# Patient Record
Sex: FEMALE | Race: WHITE | NOT HISPANIC OR LATINO | Employment: FULL TIME | URBAN - METROPOLITAN AREA
[De-identification: names, ages, dates, MRNs, and addresses within clinical notes are randomized per-mention and may not be internally consistent; named-entity substitution may affect disease eponyms.]

---

## 2017-01-04 ENCOUNTER — PATIENT MESSAGE (OUTPATIENT)
Dept: OBSTETRICS AND GYNECOLOGY | Facility: CLINIC | Age: 52
End: 2017-01-04

## 2017-01-12 ENCOUNTER — PATIENT MESSAGE (OUTPATIENT)
Dept: FAMILY MEDICINE | Facility: CLINIC | Age: 52
End: 2017-01-12

## 2017-01-18 ENCOUNTER — HOSPITAL ENCOUNTER (OUTPATIENT)
Dept: RADIOLOGY | Facility: OTHER | Age: 52
Discharge: HOME | End: 2017-01-18
Attending: OBSTETRICS & GYNECOLOGY
Payer: COMMERCIAL

## 2017-01-18 DIAGNOSIS — Z01.419 WELL WOMAN EXAM WITH ROUTINE GYNECOLOGICAL EXAM: ICD-10-CM

## 2017-01-18 PROCEDURE — 77067 SCR MAMMO BI INCL CAD: CPT | Mod: 26,,, | Performed by: INTERNAL MEDICINE

## 2017-01-18 PROCEDURE — 77067 SCR MAMMO BI INCL CAD: CPT | Mod: TC

## 2017-01-19 ENCOUNTER — PATIENT MESSAGE (OUTPATIENT)
Dept: OBSTETRICS AND GYNECOLOGY | Facility: CLINIC | Age: 52
End: 2017-01-19

## 2017-01-25 ENCOUNTER — TELEPHONE (OUTPATIENT)
Dept: OBSTETRICS AND GYNECOLOGY | Facility: CLINIC | Age: 52
End: 2017-01-25

## 2017-02-01 ENCOUNTER — TELEPHONE (OUTPATIENT)
Dept: OBSTETRICS AND GYNECOLOGY | Facility: CLINIC | Age: 52
End: 2017-02-01

## 2017-02-01 ENCOUNTER — PATIENT MESSAGE (OUTPATIENT)
Dept: OBSTETRICS AND GYNECOLOGY | Facility: CLINIC | Age: 52
End: 2017-02-01

## 2017-02-01 DIAGNOSIS — Z79.890 HORMONE REPLACEMENT THERAPY: Primary | ICD-10-CM

## 2017-02-01 RX ORDER — ESTRADIOL 1 MG/1
1 TABLET ORAL DAILY
Qty: 30 TABLET | Refills: 11 | Status: SHIPPED | OUTPATIENT
Start: 2017-02-01 | End: 2017-10-25 | Stop reason: ALTCHOICE

## 2017-02-01 RX ORDER — PROGESTERONE 100 MG/1
100 CAPSULE ORAL NIGHTLY
Qty: 30 CAPSULE | Refills: 11 | Status: SHIPPED | OUTPATIENT
Start: 2017-02-01 | End: 2017-10-25 | Stop reason: ALTCHOICE

## 2017-02-01 NOTE — TELEPHONE ENCOUNTER
Returned pt's call regarding the cost of hrt and will try estradiol 1mg and prometrium 100 mgs=daily

## 2017-02-01 NOTE — TELEPHONE ENCOUNTER
----- Message from Raheel Mcintyre sent at 2/1/2017  9:23 AM CST -----  Contact: Pt  X_ 1st Request  _ 2nd Request  _ 3rd Request    Who:ALLAN LEAL [[id number]]    Why: Patient states she would like to speak with staff in regards to medication norethindrone ac-eth estradiol (FEMHRT LOW DOSE) 0.5-2.5 mg-mcg per tablet to be switch to the microgestin birth control to have [facility] pay for it. Would like to know what the doctor recommends she should do.    What Number to Call Back: [phone]    When to Expect a call back: (Before the end of the day)  -- if call after 3:00 call back will be tomorrow.

## 2017-09-05 ENCOUNTER — PATIENT MESSAGE (OUTPATIENT)
Dept: OBSTETRICS AND GYNECOLOGY | Facility: CLINIC | Age: 52
End: 2017-09-05

## 2017-10-24 ENCOUNTER — OFFICE VISIT (OUTPATIENT)
Dept: SPORTS MEDICINE | Facility: CLINIC | Age: 52
End: 2017-10-24
Payer: COMMERCIAL

## 2017-10-24 ENCOUNTER — HOSPITAL ENCOUNTER (OUTPATIENT)
Dept: RADIOLOGY | Facility: HOSPITAL | Age: 52
Discharge: HOME | End: 2017-10-24
Attending: FAMILY MEDICINE
Payer: COMMERCIAL

## 2017-10-24 VITALS — WEIGHT: 169 LBS | BODY MASS INDEX: 31.1 KG/M2 | TEMPERATURE: 98 F | HEIGHT: 62 IN

## 2017-10-24 DIAGNOSIS — M25.521 RIGHT ELBOW PAIN: ICD-10-CM

## 2017-10-24 DIAGNOSIS — M77.11 RIGHT LATERAL EPICONDYLITIS: Primary | ICD-10-CM

## 2017-10-24 PROCEDURE — 99204 OFFICE O/P NEW MOD 45 MIN: CPT | Mod: 25,S$GLB,, | Performed by: FAMILY MEDICINE

## 2017-10-24 PROCEDURE — 99999 PR PBB SHADOW E&M-EST. PATIENT-LVL III: CPT | Mod: PBBFAC,,, | Performed by: FAMILY MEDICINE

## 2017-10-24 PROCEDURE — 20551 NJX 1 TENDON ORIGIN/INSJ: CPT | Mod: RT,S$GLB,, | Performed by: FAMILY MEDICINE

## 2017-10-24 PROCEDURE — 73080 X-RAY EXAM OF ELBOW: CPT | Mod: 26,RT,, | Performed by: RADIOLOGY

## 2017-10-24 PROCEDURE — 73080 X-RAY EXAM OF ELBOW: CPT | Mod: TC,PO,RT

## 2017-10-24 RX ORDER — TRIAMCINOLONE ACETONIDE 40 MG/ML
40 INJECTION, SUSPENSION INTRA-ARTICULAR; INTRAMUSCULAR
Status: DISCONTINUED | OUTPATIENT
Start: 2017-10-24 | End: 2017-10-24 | Stop reason: HOSPADM

## 2017-10-24 RX ADMIN — TRIAMCINOLONE ACETONIDE 40 MG: 40 INJECTION, SUSPENSION INTRA-ARTICULAR; INTRAMUSCULAR at 02:10

## 2017-10-24 NOTE — PROGRESS NOTES
Sara Ferro, a 51 y.o. female, is here for evaluation of RIGHT elbow pain.     New patient    HISTORY OF PRESENT ILLNESS  Hand dominance: right    Location: lateral elbow, right  Onset: insidious, ~ late September    Palliative:    Relative rest   Oral analgesics (NSAIDs)   Elbow sleeve   Ice/heat   Provocative:   ADLs   Gripping, grasping, wrist extension  Prior: none  Progression: plateau discomfort  Quality:   Ache at times  Sharp   Shooting pain   Radiation: none   Severity: per nursing documentation  Timing: intermittent w/ use  Trauma:    September 2017: pain following playing volleyball     Review of systems (ROS):  A 10+ review of systems was performed with pertinent positives and negatives noted above in the history of present illness. Other systems were negative unless otherwise specified.    PHYSICAL EXAMINATION  General: Patient appears alert and oriented x 3.  Mood is pleasant.  Observation of ears, eyes and nose reveal no gross abnormalities. HEENT: NCAT, sclera nonicteric  Lungs: Respirations are equal and unlabored.  Well nourished, in no acute distress and ambulates with a non-antalgic gait with no assistive devices.    Skin: Skin intact bilaterally. Sensation intact bilaterally. Compartments soft. No evidence of edema, infection, or induration.     RIGHT ELBOW EXAMINATION    Observation/Inspection  Swelling  Present    Deformity  none  Discoloration  none     Scars   none    Atrophy  none    Tenderness / Crepitus (T/C):          T / C        Medial epicondyle   + / -    Med. (Ulnar) collateral ligament  - / -    Flexor pronator Musculature   - / -   Biceps tendon    - / -   Head of radius    - / -    Lateral epicondyle   + / -    Extensor Musculature   + / -   Brachioradialis   - / -   Triceps tendon   - / -   Triceps muscle   - / -   Olecranon    - / -   Olecranon bursa   - / -   Cubital fossa    - / -   Anterior jointline   - / -   Radial tunnel    -/ -             ROM: ('*' = with  pain)    Right Elbow  AROM (PROM)     Extension   0 deg  (5 deg)*   Flexion   145 deg (145 deg)   Pronation  90 deg  (90 deg)*  Supination   80 deg  (80 deg) *    Left Elbow  AROM (PROM)   Extension   0 deg  (5 deg)  Flexion   145 deg (145 deg)   Pronation  90 deg  (90 deg)   Supination   80 deg  (80 deg)      Right Wrist  AROM (PROM)   Extension   80 deg (85 deg)*  Flexion   80 deg (85 deg) *  Ulnar Deviation   35 deg (40 deg)  Radial Deviation 35 deg (40 deg)     Left Wrist   AROM (PROM)  Extension   80 deg (85 deg)  Flexion   80 deg (85 deg)   Ulnar Deviation   35 deg (40 deg)  Radial Deviation 35 deg (40 deg)     Strength: ('*' = with pain)    Elbow Flexion   5/5*  Elbow Extension  5/5  Wrist Flexion   5/5*  Wrist Extension  5/5*      5/5*  Intrinsics   5/5  EPL (Ext. pollicis longus) 5/5  Pinch Mechanism  5/5    Elbow Examination:  See above noted areas of tenderness.   Test for Ligamentous Instability - UCL normal  Test for Ligamentous Instability - LUCL normal  PLRI       -  Tinel's (Percussion) Test - Cubital  -  Tennis Elbow Test    -  Golfer's Elbow Test    -  Radial Capitellar Grind Test   -  Valgus/Extension Overload Test  -  Resisted Long Finger Extension Pain -  Moving Valgus    -  Forearm pain with resisted supination -  Yeargeson' s (elbow pain)   -  Hook test     -    Wrist Examination:  See above noted areas of tenderness.   Finkelstein's Test    -  Tinel's Test - Carpal Tunnel   -  Phalen's Test     -  Median Nerve Compression Test  -  Ulnar-sided Compression Test  -  LT Ballottment Test    -  Snuff box tenderness    -  Bradford's Test     -  LT Instability     -  Hook of Hamate Tenderness   -     Extremity Neuro-vascular Testing: Sensation grossly intact to light touch all dermatomal regions. DTR 2+ Biceps, Triceps, BR and Negative Lizzeth's sign. Grossly intact motor function at Elbow, Wrist and Hand. Distal pulses radial and ulnar 2+, brisk cap refill, symmetric.    Other  Findings:    ASSESSMENT & PLAN   Assessment:   #1 lateral epicondylitis, right  No evidence of osseous pathology  No evidence of neurologic pathology  No evidence of vascular pathology    Imaging studies reviewed:   X-ray elbow, right 17.10    Plan:      We discussed options including:  #1 watchful waiting  #2 physical/occupational therapy aimed at:   RoM elbow   Eccentric strengthening common extensor musculature   Stretching common extensor tendon    Discussed, deferred by pt  #3 injection therapy:   CSI, origin of common extensor tendon of forearm     Right,     Left,      Discussed the likely transient therapeutic effects of CSIs in the     treatment of epicondylitis  #4 MRI elbow for further evaluation     The patient chooses #3 csi common extensor tendon of forearm right    Pain management required: handout given  Bracing required:    Tennis elbow strap, consider   Wrist immobilization brace, consider  Physical therapy required:    nfpt  Activity (e.g. sports, work) restrictions: as tolerated   school/vocation: member of [employer],      Follow up appointment offered, deferred by patient  A/e csi common extensor tendon of forearm  Effective-->hgPT, f/u in 12 w  Ineffective-->MRI elbow  Should symptoms worsen or fail to resolve, consider:  Revisiting the above options

## 2017-10-24 NOTE — PROCEDURES
Tendon Origin  Date/Time: 10/24/2017 2:53 PM  Performed by: JACQUI TOLENTINO  Authorized by: JACQUI TOLENTINO     Consent Done?:  Yes (Verbal)  Timeout: prior to procedure the correct patient, procedure, and site was verified    Indications:  Pain  Site marked: the procedure site was marked    Timeout: prior to procedure the correct patient, procedure, and site was verified    Location:  Elbow  Site:  R elbow  Prep: patient was prepped and draped in usual sterile fashion    Ultrasonic Guidance for Needle Placement?: No    Needle size:  22 G  Approach:  Anterolateral  Medications:  40 mg triamcinolone acetonide 40 mg/mL  Patient tolerance:  Patient tolerated the procedure well with no immediate complications   Origin of common extensor tendon at the lateral epicondyle

## 2017-10-25 ENCOUNTER — OFFICE VISIT (OUTPATIENT)
Dept: OBSTETRICS AND GYNECOLOGY | Facility: CLINIC | Age: 52
End: 2017-10-25
Payer: COMMERCIAL

## 2017-10-25 VITALS
WEIGHT: 175.06 LBS | DIASTOLIC BLOOD PRESSURE: 84 MMHG | BODY MASS INDEX: 32.02 KG/M2 | SYSTOLIC BLOOD PRESSURE: 142 MMHG

## 2017-10-25 DIAGNOSIS — Z01.419 WOMEN'S ANNUAL ROUTINE GYNECOLOGICAL EXAMINATION: Primary | ICD-10-CM

## 2017-10-25 DIAGNOSIS — R23.4 BREAST SKIN CHANGES: ICD-10-CM

## 2017-10-25 DIAGNOSIS — N64.4 BREAST PAIN: ICD-10-CM

## 2017-10-25 DIAGNOSIS — N76.0 ACUTE VAGINITIS: ICD-10-CM

## 2017-10-25 PROCEDURE — 87660 TRICHOMONAS VAGIN DIR PROBE: CPT

## 2017-10-25 PROCEDURE — 99999 PR PBB SHADOW E&M-EST. PATIENT-LVL III: CPT | Mod: PBBFAC,,, | Performed by: NURSE PRACTITIONER

## 2017-10-25 PROCEDURE — 99396 PREV VISIT EST AGE 40-64: CPT | Mod: S$GLB,,, | Performed by: NURSE PRACTITIONER

## 2017-10-25 PROCEDURE — 87480 CANDIDA DNA DIR PROBE: CPT

## 2017-10-25 RX ORDER — FLUCONAZOLE 150 MG/1
150 TABLET ORAL ONCE
Qty: 2 TABLET | Refills: 1 | Status: SHIPPED | OUTPATIENT
Start: 2017-10-25 | End: 2017-10-25

## 2017-10-25 NOTE — PROGRESS NOTES
CC: Annual  HPI: Pt is a 51 y.o.  female who presents for routine annual exam. She is postmenopausal.  Denies any episodes of postmenopausal bleeding.  She does not want STD screening.   Pt is complaining  of bilateral breast pain that began 2 weeks.  The pain is located all over bilateral breasts.  She describes the pain as aching and sharp- nipple extremely sensitive.  She is .  She is not on OCPs or HRT.  Reports skin changes to right breast- dark mole/ unsure when it first appeared- reports it has changed in color- darkened.  She denies nipple discharge.  She is not breastfeeding or pumping.  Denies any recent vigorous or repetitive use of pectoral muscles.  No associated neck, back, or shoulder problems.  No associated fever or erythema.  No recent history of trauma to the chest.  She has tried OTC NSAIDs with no relief of symptoms.  The pain does not affect her ability to perform daily activities.  Reports + family history of breast cancer- maternal and paternal 1st cousins and paternal aunt.   Pt is also c/o vaginal DC and itching.  Reports she has been using scented Epsom salt soaks.         ROS:  GENERAL: Feeling well overall. Denies fever or chills.   SKIN: Denies rash or lesions.   HEAD: Denies head injury or headache.   NODES: Denies enlarged lymph nodes.   CHEST: Denies chest pain or shortness of breath.   CARDIOVASCULAR: Denies palpitations or left sided chest pain.   ABDOMEN: No abdominal pain, constipation, diarrhea, nausea, vomiting or rectal bleeding.   URINARY: No dysuria, hematuria, or burning on urination.  REPRODUCTIVE: See HPI.   BREASTS: Denies pain, lumps, or nipple discharge.   HEMATOLOGIC: No easy bruisability or excessive bleeding.   MUSCULOSKELETAL: Denies joint pain or swelling.   NEUROLOGIC: Denies syncope or weakness.   PSYCHIATRIC: Denies depression, anxiety or mood swings.    PE:   APPEARANCE: Well nourished, well developed, White female in no acute distress.  NODES: no cervical,  supraclavicular, or inguinal lymphadenopathy  BREASTS: Symmetrical, + 1 mm darkened flat area to right breast.  No visible lesions. No palpable masses, nipple discharge or adenopathy bilaterally.  ABDOMEN: Soft. No tenderness or masses. No distention. No hernias palpated. No CVA tenderness.  VULVA: No lesions. Normal external female genitalia.  URETHRAL MEATUS: Normal size and location, no lesions, no prolapse.  URETHRA: No masses, tenderness, or prolapse.  VAGINA: Moist. No lesions or lacerations noted. No abnormal discharge present. No odor present.   CERVIX: No lesions or discharge. No cervical motion tenderness.   UTERUS: Normal size, regular shape, mobile, non-tender.  ADNEXA: No tenderness. No fullness or masses palpated in the adnexal regions.   ANUS PERINEUM: Normal.          Diagnosis:  1. Women's annual routine gynecological examination    2. Breast pain    3. Breast skin changes    4. Acute vaginitis        Plan:   Pap not indicated- last pap 12/2016 WNL  Mammo and US  Referral to breast center for evaluation of pain  Discussed to ensure well fitted support bra.   Affirm  Diflucan    Orders Placed This Encounter    Vaginosis Screen by DNA Probe    Mammo Digital Diagnostic Bilat with Jhon    US Breast Bilateral Complete    Ambulatory Referral to Breast Surgery    fluconazole (DIFLUCAN) 150 MG Tab     Patient was counseled today on the new ACS guidelines for cervical cytology screening as well as the current recommendations for breast cancer screening. She was counseled to follow up with her PCP for other routine health maintenance. Counseling session lasted approximately 10 minutes, and all her questions were answered.    Follow-up with me in 1 year for routine exam    SUSANNE Cross

## 2017-10-26 LAB
CANDIDA RRNA VAG QL PROBE: POSITIVE
G VAGINALIS RRNA GENITAL QL PROBE: NEGATIVE
T VAGINALIS RRNA GENITAL QL PROBE: NEGATIVE

## 2017-11-02 ENCOUNTER — TELEPHONE (OUTPATIENT)
Dept: OBSTETRICS AND GYNECOLOGY | Facility: CLINIC | Age: 52
End: 2017-11-02

## 2017-11-02 ENCOUNTER — HOSPITAL ENCOUNTER (OUTPATIENT)
Dept: RADIOLOGY | Facility: HOSPITAL | Age: 52
Discharge: HOME | End: 2017-11-02
Attending: NURSE PRACTITIONER
Payer: COMMERCIAL

## 2017-11-02 VITALS — HEIGHT: 62 IN | BODY MASS INDEX: 32.2 KG/M2 | WEIGHT: 175 LBS

## 2017-11-02 DIAGNOSIS — Z12.31 VISIT FOR SCREENING MAMMOGRAM: Primary | ICD-10-CM

## 2017-11-02 DIAGNOSIS — N64.4 BREAST PAIN: ICD-10-CM

## 2017-11-02 PROCEDURE — 77066 DX MAMMO INCL CAD BI: CPT | Mod: TC

## 2017-11-02 PROCEDURE — 77062 BREAST TOMOSYNTHESIS BI: CPT | Mod: 26,,, | Performed by: RADIOLOGY

## 2017-11-02 PROCEDURE — 77066 DX MAMMO INCL CAD BI: CPT | Mod: 26,,, | Performed by: RADIOLOGY

## 2017-11-02 PROCEDURE — 76641 ULTRASOUND BREAST COMPLETE: CPT | Mod: 26,LT,, | Performed by: RADIOLOGY

## 2017-11-02 PROCEDURE — 76641 ULTRASOUND BREAST COMPLETE: CPT | Mod: 26,RT,, | Performed by: RADIOLOGY

## 2017-11-02 PROCEDURE — 76641 ULTRASOUND BREAST COMPLETE: CPT | Mod: TC,50

## 2017-11-02 NOTE — TELEPHONE ENCOUNTER
Called and informed patient that after reviewing her chart, no changes are needed. Patient verbalized understanding.

## 2017-11-02 NOTE — TELEPHONE ENCOUNTER
----- Message from Anamaria Jack sent at 11/2/2017  8:15 AM CDT -----  Contact: self  Patient states her insurance company does not cover the 3D mammogram and is requesting to order to be changed to a routine screening mammogram. Patient has appointment for 10:30 at [facility] breast [place].

## 2017-11-02 NOTE — TELEPHONE ENCOUNTER
----- Message from Anamaria Jack sent at 11/2/2017  8:15 AM CDT -----  Contact: self  Patient states her insurance company does not cover the 3D mammogram and is requesting to order to be changed to a routine screening mammogram. Patient has appointment for 10:30 at [facility] breast [place].

## 2018-02-27 ENCOUNTER — OFFICE VISIT (OUTPATIENT)
Dept: SPORTS MEDICINE | Facility: CLINIC | Age: 53
End: 2018-02-27
Payer: COMMERCIAL

## 2018-02-27 VITALS — TEMPERATURE: 98 F | HEIGHT: 62 IN | WEIGHT: 175 LBS | BODY MASS INDEX: 32.2 KG/M2

## 2018-02-27 DIAGNOSIS — M25.521 RIGHT ELBOW PAIN: ICD-10-CM

## 2018-02-27 DIAGNOSIS — M77.11 RIGHT LATERAL EPICONDYLITIS: Primary | ICD-10-CM

## 2018-02-27 PROCEDURE — 20551 NJX 1 TENDON ORIGIN/INSJ: CPT | Mod: RT,S$GLB,, | Performed by: FAMILY MEDICINE

## 2018-02-27 PROCEDURE — 3008F BODY MASS INDEX DOCD: CPT | Mod: S$GLB,,, | Performed by: FAMILY MEDICINE

## 2018-02-27 PROCEDURE — 99214 OFFICE O/P EST MOD 30 MIN: CPT | Mod: 25,S$GLB,, | Performed by: FAMILY MEDICINE

## 2018-02-27 PROCEDURE — 99999 PR PBB SHADOW E&M-EST. PATIENT-LVL III: CPT | Mod: PBBFAC,,, | Performed by: FAMILY MEDICINE

## 2018-02-27 RX ORDER — TRIAMCINOLONE ACETONIDE 40 MG/ML
40 INJECTION, SUSPENSION INTRA-ARTICULAR; INTRAMUSCULAR
Status: DISCONTINUED | OUTPATIENT
Start: 2018-02-27 | End: 2018-02-27 | Stop reason: HOSPADM

## 2018-02-27 RX ADMIN — TRIAMCINOLONE ACETONIDE 40 MG: 40 INJECTION, SUSPENSION INTRA-ARTICULAR; INTRAMUSCULAR at 12:02

## 2018-02-27 NOTE — PROGRESS NOTES
Sara Ferro, a 52 y.o. female, is here for evaluation of RIGHT elbow pain.     HISTORY OF PRESENT ILLNESS  Hand dominance: right    Location: lateral elbow, right  Onset: insidious, ~ late September 2017  Palliative:    Relative rest   Oral analgesics (NSAIDs)   Elbow sleeve   Ice/heat    CSI, lat epi, 10/24/17, effective 100%  Provocative:   ADLs   Gripping, grasping, wrist extension  Prior: none  Progression: worsening discomfort since mid-February 2018  Quality:   Ache at times  Sharp   Pain at night   Radiation: none   Severity: per nursing documentation  Timing: intermittent w/ use  Trauma:    September 2017: pain following playing volleyball     Review of systems (ROS):  A 10+ review of systems was performed with pertinent positives and negatives noted above in the history of present illness. Other systems were negative unless otherwise specified.    PHYSICAL EXAMINATION  General: Patient appears alert and oriented x 3.  Mood is pleasant.  Observation of ears, eyes and nose reveal no gross abnormalities. HEENT: NCAT, sclera nonicteric  Lungs: Respirations are equal and unlabored.  Well nourished, in no acute distress and ambulates with a non-antalgic gait with no assistive devices.    Skin: Skin intact bilaterally. Sensation intact bilaterally. Compartments soft. No evidence of edema, infection, or induration.     RIGHT ELBOW EXAMINATION    Observation/Inspection  Swelling  Present    Deformity  none  Discoloration  none     Scars   none    Atrophy  none    Tenderness / Crepitus (T/C):          T / C        Medial epicondyle   + / -    Med. (Ulnar) collateral ligament  - / -    Flexor pronator Musculature   - / -   Biceps tendon    - / -   Head of radius    - / -    Lateral epicondyle   + / -    Extensor Musculature   + / -   Brachioradialis   - / -   Triceps tendon   - / -   Triceps muscle   - / -   Olecranon    - / -   Olecranon bursa   - / -   Cubital fossa    - / -   Anterior jointline   - / -    Radial tunnel    -/ -             ROM: ('*' = with pain)    Right Elbow  AROM (PROM)     Extension   0 deg  (5 deg)*   Flexion   145 deg (145 deg)   Pronation  90 deg  (90 deg)*  Supination   80 deg  (80 deg) *    Left Elbow  AROM (PROM)   Extension   0 deg  (5 deg)  Flexion   145 deg (145 deg)   Pronation  90 deg  (90 deg)   Supination   80 deg  (80 deg)      Right Wrist  AROM (PROM)   Extension   80 deg (85 deg)*  Flexion   80 deg (85 deg) *  Ulnar Deviation   35 deg (40 deg)  Radial Deviation 35 deg (40 deg)     Left Wrist   AROM (PROM)  Extension   80 deg (85 deg)  Flexion   80 deg (85 deg)   Ulnar Deviation   35 deg (40 deg)  Radial Deviation 35 deg (40 deg)     Strength: ('*' = with pain)    Elbow Flexion   5/5*  Elbow Extension  5/5  Wrist Flexion   5/5*  Wrist Extension  5/5*      5/5*  Intrinsics   5/5  EPL (Ext. pollicis longus) 5/5  Pinch Mechanism  5/5    Elbow Examination:  See above noted areas of tenderness.   Test for Ligamentous Instability - UCL normal  Test for Ligamentous Instability - LUCL normal  PLRI       -  Tinel's (Percussion) Test - Cubital  -  Tennis Elbow Test    -  Golfer's Elbow Test    -  Radial Capitellar Grind Test   -  Valgus/Extension Overload Test  -  Resisted Long Finger Extension Pain +  Moving Valgus     -  Forearm pain with resisted supination -  Yeargeson' s (elbow pain)   -  Hook test     -    Wrist Examination:  See above noted areas of tenderness.   Finkelstein's Test    -  Tinel's Test - Carpal Tunnel   -  Phalen's Test     -  Median Nerve Compression Test  -  Ulnar-sided Compression Test  -  LT Ballottment Test    -  Snuff box tenderness    -  Bradford's Test     -  LT Instability     -  Hook of Hamate Tenderness   -     Extremity Neuro-vascular Testing: Sensation grossly intact to light touch all dermatomal regions. DTR 2+ Biceps, Triceps, BR and Negative Lizzeth's sign. Grossly intact motor function at Elbow, Wrist and Hand. Distal pulses radial and ulnar 2+,  brisk cap refill, symmetric.    Other Findings:    ASSESSMENT & PLAN   Assessment:   #1 lateral epicondylitis, right  No evidence of osseous pathology  No evidence of neurologic pathology  No evidence of vascular pathology    Imaging studies reviewed:   X-ray elbow, right 17.10    Plan:      We discussed options including:  #1 watchful waiting  #2 physical/occupational therapy aimed at:   RoM elbow   Eccentric strengthening common extensor musculature   Stretching common extensor tendon    Discussed, deferred by pt  #3 injection therapy:   CSI, origin of common extensor tendon of forearm     Right, effective 100%, repeat    Left,      Discussed the likely transient therapeutic effects of CSIs in the     treatment of epicondylitis  #4 per ten, common extensor tendon   Likely in 18.12     The patient chooses #3 csi common extensor tendon of forearm right    Pain management required: handout given  Bracing required:    Tennis elbow strap, discussed today   Wrist immobilization brace, consider  Physical therapy required:    nfpt  Activity (e.g. sports, work) restrictions: as tolerated   school/vocation: member of [employer],      Follow up in 12 w  Did she get tennis elbow strap? Was it effective?  A/e csi le  Should symptoms worsen or fail to resolve, consider:  Revisiting the above options

## 2018-02-27 NOTE — PROCEDURES
Tendon Origin  Date/Time: 2/27/2018 12:01 PM  Performed by: JACQUI TOLENTINO  Authorized by: JACQUI TOLENTINO     Consent Done?:  Yes (Verbal)  Timeout: prior to procedure the correct patient, procedure, and site was verified    Indications:  Pain  Site marked: the procedure site was marked    Timeout: prior to procedure the correct patient, procedure, and site was verified    Location:  Elbow  Site:  R elbow  Prep: patient was prepped and draped in usual sterile fashion    Ultrasonic Guidance for Needle Placement?: No    Needle size:  22 G  Approach:  Anterolateral  Medications:  40 mg triamcinolone acetonide 40 mg/mL  Patient tolerance:  Patient tolerated the procedure well with no immediate complications   Origin of common extensor tendon at the lateral epicondyle

## 2018-06-07 DIAGNOSIS — Z12.11 COLON CANCER SCREENING: Primary | ICD-10-CM

## 2018-06-11 ENCOUNTER — DOCUMENTATION ONLY (OUTPATIENT)
Dept: FAMILY MEDICINE | Facility: CLINIC | Age: 53
End: 2018-06-11

## 2018-06-11 ENCOUNTER — OFFICE VISIT (OUTPATIENT)
Dept: FAMILY MEDICINE | Facility: CLINIC | Age: 53
End: 2018-06-11
Payer: COMMERCIAL

## 2018-06-11 VITALS
RESPIRATION RATE: 16 BRPM | OXYGEN SATURATION: 98 % | HEART RATE: 79 BPM | HEIGHT: 62 IN | SYSTOLIC BLOOD PRESSURE: 110 MMHG | BODY MASS INDEX: 32.74 KG/M2 | DIASTOLIC BLOOD PRESSURE: 82 MMHG | TEMPERATURE: 98 F | WEIGHT: 177.94 LBS

## 2018-06-11 DIAGNOSIS — M77.11 RIGHT LATERAL EPICONDYLITIS: ICD-10-CM

## 2018-06-11 DIAGNOSIS — Z00.00 ANNUAL PHYSICAL EXAM: Primary | ICD-10-CM

## 2018-06-11 DIAGNOSIS — R53.81 MALAISE AND FATIGUE: ICD-10-CM

## 2018-06-11 DIAGNOSIS — R53.83 MALAISE AND FATIGUE: ICD-10-CM

## 2018-06-11 DIAGNOSIS — N95.1 MENOPAUSAL AND FEMALE CLIMACTERIC STATES: ICD-10-CM

## 2018-06-11 PROCEDURE — 99396 PREV VISIT EST AGE 40-64: CPT | Mod: S$GLB,,, | Performed by: INTERNAL MEDICINE

## 2018-06-11 NOTE — PATIENT INSTRUCTIONS
High intensity training for exercise.  (HIT)    Use a Swiss ball.    Get a back buddy Keshav.  Smart temp cold pack.

## 2018-06-11 NOTE — PROGRESS NOTES
Health Maintenance Due   Topic Date Due    TETANUS VACCINE  11/29/1984    Colonoscopy  11/29/2016

## 2018-06-11 NOTE — PROGRESS NOTES
"Subjective:       Patient ID: Sara Ferro is a 52 y.o. female.    Chief Complaint: Annual Exam (wellness)    HPI     Has had elbow tendenitis for 1 y    Not getting exercise due to that.     Non-smoker, no drugs. Rare alcohol.    Post-menopause, lots of hot flashes.        Review of Systems   Constitutional: Positive for diaphoresis and fatigue. Negative for fever and unexpected weight change.   HENT: Negative for dental problem, hearing loss, nosebleeds, rhinorrhea, tinnitus, trouble swallowing and voice change.    Eyes: Negative for itching and visual disturbance.   Respiratory: Negative for cough, shortness of breath and wheezing.    Cardiovascular: Negative for chest pain and palpitations.   Gastrointestinal: Negative for abdominal pain, blood in stool, constipation, diarrhea, nausea and vomiting.   Endocrine: Negative for cold intolerance, heat intolerance, polydipsia and polyphagia.   Genitourinary: Negative for difficulty urinating and dysuria.   Musculoskeletal: Negative for arthralgias.   Allergic/Immunologic: Negative for environmental allergies and immunocompromised state.   Neurological: Negative for dizziness, seizures, weakness, numbness and headaches.   Hematological: Does not bruise/bleed easily.   Psychiatric/Behavioral: Positive for sleep disturbance (hot flashes. severe). Negative for agitation, dysphoric mood and suicidal ideas. The patient is not nervous/anxious.        Objective:      Vitals:    06/11/18 1531   BP: 110/82   Pulse: 79   Resp: 16   Temp: 97.7 °F (36.5 °C)   TempSrc: Oral   SpO2: 98%   Weight: 80.7 kg (177 lb 14.6 oz)   Height: 5' 2" (1.575 m)   PainSc:   7   PainLoc: Back     Physical Exam   Constitutional: She is oriented to person, place, and time. She appears well-nourished. No distress.   HENT:   Head: Normocephalic and atraumatic.   Right Ear: External ear normal.   Left Ear: External ear normal.   Mouth/Throat: Oropharynx is clear and moist. No oropharyngeal " exudate.   Eyes: Conjunctivae and EOM are normal. Pupils are equal, round, and reactive to light. No scleral icterus.   Neck: Normal range of motion. Neck supple. No thyromegaly present.   Cardiovascular: Normal rate, regular rhythm, normal heart sounds and intact distal pulses.  Exam reveals no gallop and no friction rub.    No murmur heard.  Pulmonary/Chest: Effort normal and breath sounds normal. No respiratory distress. She has no wheezes. She has no rales. She exhibits no tenderness.   Abdominal: Soft. Bowel sounds are normal. She exhibits no distension. There is no tenderness.   Musculoskeletal: Normal range of motion. She exhibits no edema or tenderness.   Lymphadenopathy:     She has no cervical adenopathy.   Neurological: She is alert and oriented to person, place, and time. She displays normal reflexes. No cranial nerve deficit. She exhibits normal muscle tone. Coordination normal.   Skin: Skin is warm and dry. No rash noted.   Psychiatric: She has a normal mood and affect. Her behavior is normal. Judgment and thought content normal.   Nursing note and vitals reviewed.        Assessment:       No diagnosis found.      Plan:     There are no diagnoses linked to this encounter.  No Follow-up on file.

## 2018-11-12 ENCOUNTER — OFFICE VISIT (OUTPATIENT)
Dept: SPORTS MEDICINE | Facility: CLINIC | Age: 53
End: 2018-11-12
Payer: COMMERCIAL

## 2018-11-12 ENCOUNTER — HOSPITAL ENCOUNTER (OUTPATIENT)
Dept: RADIOLOGY | Facility: HOSPITAL | Age: 53
Discharge: HOME | End: 2018-11-12
Attending: FAMILY MEDICINE
Payer: COMMERCIAL

## 2018-11-12 VITALS — TEMPERATURE: 98 F | HEIGHT: 62 IN | WEIGHT: 177.94 LBS | BODY MASS INDEX: 32.74 KG/M2

## 2018-11-12 DIAGNOSIS — M25.521 RIGHT ELBOW PAIN: ICD-10-CM

## 2018-11-12 DIAGNOSIS — R53.81 PHYSICAL DECONDITIONING: ICD-10-CM

## 2018-11-12 DIAGNOSIS — M77.11 LATERAL EPICONDYLITIS OF RIGHT ELBOW: Primary | ICD-10-CM

## 2018-11-12 PROCEDURE — 76942 ECHO GUIDE FOR BIOPSY: CPT | Mod: 26,S$GLB,, | Performed by: FAMILY MEDICINE

## 2018-11-12 PROCEDURE — 73080 X-RAY EXAM OF ELBOW: CPT | Mod: TC,FY,PO,RT

## 2018-11-12 PROCEDURE — 20551 NJX 1 TENDON ORIGIN/INSJ: CPT | Mod: RT,S$GLB,, | Performed by: FAMILY MEDICINE

## 2018-11-12 PROCEDURE — 99214 OFFICE O/P EST MOD 30 MIN: CPT | Mod: 25,S$GLB,, | Performed by: FAMILY MEDICINE

## 2018-11-12 PROCEDURE — 3008F BODY MASS INDEX DOCD: CPT | Mod: CPTII,S$GLB,, | Performed by: FAMILY MEDICINE

## 2018-11-12 PROCEDURE — 73080 X-RAY EXAM OF ELBOW: CPT | Mod: 26,RT,, | Performed by: RADIOLOGY

## 2018-11-12 PROCEDURE — 99999 PR PBB SHADOW E&M-EST. PATIENT-LVL III: CPT | Mod: PBBFAC,,, | Performed by: FAMILY MEDICINE

## 2018-11-12 RX ORDER — TRIAMCINOLONE ACETONIDE 40 MG/ML
40 INJECTION, SUSPENSION INTRA-ARTICULAR; INTRAMUSCULAR
Status: DISCONTINUED | OUTPATIENT
Start: 2018-11-12 | End: 2018-11-12 | Stop reason: HOSPADM

## 2018-11-12 RX ADMIN — TRIAMCINOLONE ACETONIDE 40 MG: 40 INJECTION, SUSPENSION INTRA-ARTICULAR; INTRAMUSCULAR at 11:11

## 2018-11-12 NOTE — PROCEDURES
"Tendon Origin: R elbow  Date/Time: 11/12/2018 11:24 AM  Performed by: Jovani Blanton MD  Authorized by: Jovani Blanton MD     Consent Done?:  Yes (Verbal)  Timeout: prior to procedure the correct patient, procedure, and site was verified    Indications:  Pain  Site marked: the procedure site was marked    Timeout: prior to procedure the correct patient, procedure, and site was verified    Location:  Elbow  Site:  R elbow  Prep: patient was prepped and draped in usual sterile fashion    Ultrasonic Guidance for Needle Placement?: Yes    Needle size:  25 G  Approach:  Anterolateral  Medications:  40 mg triamcinolone acetonide 40 mg/mL  Patient tolerance:  Patient tolerated the procedure well with no immediate complications   Origin of common extensor tendon at the lateral epicondyle  Description of ultrasound utilization for needle guidance:   Ultrasound guidance used for needle localization. Images saved and stored for documentation. The common extensor tendon was visualized at its origin at the lateral epicondyle. Dynamic visualization of the 25g x 1.5" needle was continuous throughout the procedure.      "

## 2018-11-12 NOTE — PROGRESS NOTES
Sara Ferro, a 52 y.o. female, is here for evaluation of RIGHT elbow pain.     HISTORY OF PRESENT ILLNESS  Hand dominance: right    Location: lateral elbow, right  Onset: insidious, ~ late September 2017  Palliative:    Relative rest   Oral analgesics (NSAIDs)   Elbow sleeve   Ice/heat    CSI, lat epi, 10/24/17, effective 100%   CSI, lat epi, 17.02.27, effective 6 months  Provocative:   ADLs   Gripping, grasping, wrist extension  Prior: none  Progression: worsening discomfort since mid-February 2018  Quality:   Ache at times  Sharp   Pain at night   Radiation: none   Severity: per nursing documentation  Timing: intermittent w/ use  Trauma:    September 2017: pain following playing volleyball     Review of systems (ROS):  A 10+ review of systems was performed with pertinent positives and negatives noted above in the history of present illness. Other systems were negative unless otherwise specified.    PHYSICAL EXAMINATION  General: Patient appears alert and oriented x 3.  Mood is pleasant.  Observation of ears, eyes and nose reveal no gross abnormalities. HEENT: NCAT, sclera nonicteric  Lungs: Respirations are equal and unlabored.  Well nourished, in no acute distress and ambulates with a non-antalgic gait with no assistive devices.    Skin: Skin intact bilaterally. Sensation intact bilaterally. Compartments soft. No evidence of edema, infection, or induration.     RIGHT ELBOW EXAMINATION    Observation/Inspection  Swelling  Present    Deformity  none  Discoloration  none     Scars   none    Atrophy  none    Tenderness / Crepitus (T/C):          T / C        Medial epicondyle   + / -    Med. (Ulnar) collateral ligament  - / -    Flexor pronator Musculature   - / -   Biceps tendon    - / -   Head of radius    - / -    Lateral epicondyle   + / -    Extensor Musculature   + / -   Brachioradialis   - / -   Triceps tendon   - / -   Triceps muscle   - / -   Olecranon    - / -   Olecranon bursa   - / -   Cubital  fossa    - / -   Anterior jointline   - / -   Radial tunnel    -/ -             ROM: ('*' = with pain)    Right Elbow  AROM (PROM)     Extension   0 deg  (5 deg)*   Flexion   145 deg (145 deg)   Pronation  90 deg  (90 deg)*  Supination   80 deg  (80 deg) *    Left Elbow  AROM (PROM)   Extension   0 deg  (5 deg)  Flexion   145 deg (145 deg)   Pronation  90 deg  (90 deg)   Supination   80 deg  (80 deg)      Right Wrist  AROM (PROM)   Extension   80 deg (85 deg)*  Flexion   80 deg (85 deg) *  Ulnar Deviation   35 deg (40 deg)  Radial Deviation 35 deg (40 deg)     Left Wrist   AROM (PROM)  Extension   80 deg (85 deg)  Flexion   80 deg (85 deg)   Ulnar Deviation   35 deg (40 deg)  Radial Deviation 35 deg (40 deg)     Strength: ('*' = with pain)    Elbow Flexion   5/5*  Elbow Extension  5/5  Wrist Flexion   5/5*  Wrist Extension  5/5*      5/5*  Intrinsics   5/5  EPL (Ext. pollicis longus) 5/5  Pinch Mechanism  5/5    Elbow Examination:  See above noted areas of tenderness.   Test for Ligamentous Instability - UCL normal  Test for Ligamentous Instability - LUCL normal  PLRI       -  Tinel's (Percussion) Test - Cubital  -  Tennis Elbow Test    -  Golfer's Elbow Test    -  Radial Capitellar Grind Test   -  Valgus/Extension Overload Test  -  Resisted Long Finger Extension Pain +  Moving Valgus     -  Forearm pain with resisted supination -  Yeargeson' s (elbow pain)   -  Hook test     -    Wrist Examination:  See above noted areas of tenderness.   Finkelstein's Test    -  Tinel's Test - Carpal Tunnel   -  Phalen's Test     -  Median Nerve Compression Test  -  Ulnar-sided Compression Test  -  LT Ballottment Test    -  Snuff box tenderness    -  Bradford's Test     -  LT Instability     -  Hook of Hamate Tenderness   -     Extremity Neuro-vascular Testing: Sensation grossly intact to light touch all dermatomal regions. DTR 2+ Biceps, Triceps, BR and Negative Lizzeth's sign. Grossly intact motor function at Elbow, Wrist and  Hand. Distal pulses radial and ulnar 2+, brisk cap refill, symmetric.    Other Findings:    ASSESSMENT & PLAN   Assessment:   #1 lateral epicondylitis, right  No evidence of osseous pathology  No evidence of neurologic pathology  No evidence of vascular pathology    Imaging studies reviewed:   X-ray elbow, right 18.11    Plan:      We discussed options including:  #1 watchful waiting  #2 physical/occupational therapy aimed at:   RoM elbow   Eccentric strengthening common extensor musculature   Stretching common extensor tendon    Discussed, deferred by pt  #3 injection therapy:   CSI, origin of common extensor tendon of forearm     Right, effective 100%, repeat    Left,      Discussed the likely transient therapeutic effects of CSIs in the     treatment of epicondylitis  #4 perc tenotomy, common extensor tendon   Likely in 19.01     The patient chooses #2 and #3 csi common extensor tendon of forearm right    Pain management required: handout given  Bracing required:    Tennis elbow strap, continue   Wrist immobilization brace, discussed  Physical therapy required:    fPT, @ [facility], begin as above   Activity (e.g. sports, work) restrictions: as tolerated   school/vocation: member of [employer],      Follow up in 19.01  A/e fPT, a/e csi le, review which braces she's using  Should symptoms worsen or fail to resolve, consider:  Revisiting the above options

## 2019-01-03 ENCOUNTER — OFFICE VISIT (OUTPATIENT)
Dept: SPORTS MEDICINE | Facility: CLINIC | Age: 54
End: 2019-01-03
Payer: COMMERCIAL

## 2019-01-03 VITALS — WEIGHT: 177 LBS | HEIGHT: 62 IN | BODY MASS INDEX: 32.57 KG/M2 | TEMPERATURE: 98 F

## 2019-01-03 DIAGNOSIS — M77.11 LATERAL EPICONDYLITIS, RIGHT ELBOW: Primary | ICD-10-CM

## 2019-01-03 PROCEDURE — 24357 PR TENOTOMY ELBOW LATERAL/MEDIAL PERCUTANEOUS: ICD-10-PCS | Mod: RT,S$GLB,, | Performed by: FAMILY MEDICINE

## 2019-01-03 PROCEDURE — 99499 NO LOS: ICD-10-PCS | Mod: S$GLB,,, | Performed by: FAMILY MEDICINE

## 2019-01-03 PROCEDURE — 24357 REPAIR ELBOW PERC: CPT | Mod: RT,S$GLB,, | Performed by: FAMILY MEDICINE

## 2019-01-03 PROCEDURE — 99999 PR PBB SHADOW E&M-EST. PATIENT-LVL III: ICD-10-PCS | Mod: PBBFAC,,, | Performed by: FAMILY MEDICINE

## 2019-01-03 PROCEDURE — 99499 UNLISTED E&M SERVICE: CPT | Mod: S$GLB,,, | Performed by: FAMILY MEDICINE

## 2019-01-03 PROCEDURE — 99999 PR PBB SHADOW E&M-EST. PATIENT-LVL III: CPT | Mod: PBBFAC,,, | Performed by: FAMILY MEDICINE

## 2019-01-03 NOTE — PROGRESS NOTES
Patient indications  #1 lateral epicondylitis, right    Procedure performed  Percutaneous tenotomy of common extensor tendon using ultrasound guidance to cut and remove pathologic tendon tissue    Patient consent to perform procedure  See the electronic medical record for full written and signed patient consent to proceed with this procedure.    Description of procedure     A) Surgical time out  A surgical time out was performed, including verification of patient ID, procedure to be performed, site and side, availability of information and equipment, review of safety issues, and the patients agreement and consent.     B) Preparation for tenotomy  Using 1) physical examination and 2) musculoskeletal ultrasound, the anatomy was visualized and the diseased tissue of the tendon at the lateral humeral epicondyle was identified and confirmed. The procedure site was marked with a skin marker. The patient was placed in position maximizing 1) physician access to pathologic tissue and 2) patient comfort. The skin about the area was sterilized with ChloraPrep (2%w/v CHG, 70% IPA). The intended pathway of probe insertion was anesthetized using approximately 3cc of injected 1% lidocaine via a 22 gauge needle. An #11 blade scalpel was used to puncture through the skin and down to the site of pathologic tissue.     C) Cutting and removal of pathologic tendon tissue  The surgical instrument (Tenex TX Micro Tip) was introduced through the puncture site and advanced to the site of tendinosis / chronic inflammatory change. When the tip of the surgical probe was confirmed to be on the diseased common extensor tendon tendon tissue, the foot pedal was depressed and the tendon was incised along its length cutting and removing the diseased tendon tissue.     D) Post-procedure care  Following the procedure, a sterile 4 x 4 inch gauze was placed over the incision site and the gauze was covered with a sterile transparent dressing. The  patient was fit with a shoulder sling and wrist immobilizer before discharge.     Complications: none    Estimated blood loss from procedure: none    Disposition  The patient tolerated the procedure well and there were no immediate complications. The patient was instructed to call the clinic immediately for any mild to moderate adverse side effects, or to call 911 in the event of an emergency.       Description of ultrasound utilization for needle / probe guidance  Ultrasound guidance was used for needle / probe localization. Images (and videos, if appropriate) were saved and stored for documentation. Dynamic visualization of the needle / probe was continuous throughout the procedure.    The patient was fit with, adjusted to, and trained in the use of a custom orthotic / brace. The patient demonstrated understanding in the use and proper care of the equipment.   This interaction took 15 minutes.    References  2017 https://www.ncbi.nlm.nih.gov/pubmed/59813793  2016 https://www.ncbi.nlm.nih.gov/pubmed/03716504    [id number]

## 2019-01-17 ENCOUNTER — OFFICE VISIT (OUTPATIENT)
Dept: SPORTS MEDICINE | Facility: CLINIC | Age: 54
End: 2019-01-17
Payer: COMMERCIAL

## 2019-01-17 VITALS — WEIGHT: 177 LBS | TEMPERATURE: 98 F | HEIGHT: 62 IN | BODY MASS INDEX: 32.57 KG/M2

## 2019-01-17 DIAGNOSIS — M77.11 LATERAL EPICONDYLITIS OF RIGHT ELBOW: Primary | ICD-10-CM

## 2019-01-17 DIAGNOSIS — M77.11 RIGHT LATERAL EPICONDYLITIS: Primary | ICD-10-CM

## 2019-01-17 PROCEDURE — 99999 PR PBB SHADOW E&M-EST. PATIENT-LVL III: CPT | Mod: PBBFAC,,, | Performed by: FAMILY MEDICINE

## 2019-01-17 PROCEDURE — 99999 PR PBB SHADOW E&M-EST. PATIENT-LVL III: ICD-10-PCS | Mod: PBBFAC,,, | Performed by: FAMILY MEDICINE

## 2019-01-17 PROCEDURE — 99024 POSTOP FOLLOW-UP VISIT: CPT | Mod: S$GLB,,, | Performed by: FAMILY MEDICINE

## 2019-01-17 PROCEDURE — 99024 PR POST-OP FOLLOW-UP VISIT: ICD-10-PCS | Mod: S$GLB,,, | Performed by: FAMILY MEDICINE

## 2019-01-17 NOTE — PROGRESS NOTES
Sara Ferro, a 53 y.o. female, is here for evaluation of RIGHT elbow pain.     HISTORY OF PRESENT ILLNESS  Hand dominance: right    Location: lateral elbow, right  Onset: insidious, ~ late September 2017  Palliative:    Relative rest   Oral analgesics (NSAIDs)   Elbow sleeve   Ice/heat    CSI, lat epi, 10/24/17, effective 100%   CSI, lat epi, 17.02.27, effective 6 months   CSI, lat epi, 18.11.12, no relief   Tenotomy Lateral Epicondyle - 19.01.03 - significant improvement, some restrictions with elbow extension.  Provocative: Resolving  ADLs   Gripping, grasping, wrist extension  Prior: none  Progression: Resolved discomfort   Quality: Resolving  Ache at times  Sharp   Pain at night   Radiation: none   Severity: per nursing documentation  Timing: intermittent w/ use  Trauma:    September 2017: pain following playing volleyball     Review of systems (ROS):  A 10+ review of systems was performed with pertinent positives and negatives noted above in the history of present illness. Other systems were negative unless otherwise specified.    PHYSICAL EXAMINATION  General: Patient appears alert and oriented x 3.  Mood is pleasant.  Observation of ears, eyes and nose reveal no gross abnormalities. HEENT: NCAT, sclera nonicteric  Lungs: Respirations are equal and unlabored.  Well nourished, in no acute distress and ambulates with a non-antalgic gait with no assistive devices.    Skin: Skin intact bilaterally. Sensation intact bilaterally. Compartments soft. No evidence of edema, infection, or induration.     RIGHT ELBOW EXAMINATION    Observation/Inspection  Swelling  Present    Deformity  none  Discoloration  none     Scars   none    Atrophy  none    Tenderness / Crepitus (T/C):   Resolved, pt. is not TTP         T / C        Medial epicondyle   + / -    Med. (Ulnar) collateral ligament  - / -    Flexor pronator Musculature   - / -   Biceps tendon    - / -   Head of radius    - / -    Lateral epicondyle   + / -     Extensor Musculature   + / -   Brachioradialis   - / -   Triceps tendon   - / -   Triceps muscle   - / -   Olecranon    - / -   Olecranon bursa   - / -   Cubital fossa    - / -   Anterior jointline   - / -   Radial tunnel    -/ -             ROM: ('*' = with pain)    Right Elbow  AROM (PROM)     Extension   0 deg  (5 deg), mild discomfort with elbow extension  Flexion   145 deg (145 deg)   Pronation  90 deg  (90 deg)* - Resolved  Supination   80 deg  (80 deg) * - Resolved    Left Elbow  AROM (PROM)   Extension   0 deg  (5 deg)  Flexion   145 deg (145 deg)   Pronation  90 deg  (90 deg)   Supination   80 deg  (80 deg)      Right Wrist  AROM (PROM)   Extension   80 deg (85 deg)* - Resolved  Flexion   80 deg (85 deg) * - Resolved  Ulnar Deviation   35 deg (40 deg)  Radial Deviation 35 deg (40 deg)     Left Wrist   AROM (PROM)  Extension   80 deg (85 deg)  Flexion   80 deg (85 deg)   Ulnar Deviation   35 deg (40 deg)  Radial Deviation 35 deg (40 deg)     Strength: ('*' = with pain) - Resolved   Elbow Flexion   4/5*   Elbow Extension  4/5  Wrist Flexion   4/5*  Wrist Extension  4/5*      4/5*  Intrinsics   4/5  EPL (Ext. pollicis longus) 4/5  Pinch Mechanism  4/5    Elbow Examination: Resolved  See above noted areas of tenderness.   Test for Ligamentous Instability - UCL normal  Test for Ligamentous Instability - LUCL normal  PLRI       -  Tinel's (Percussion) Test - Cubital  -  Tennis Elbow Test    +  Golfer's Elbow Test    -  Radial Capitellar Grind Test   -  Valgus/Extension Overload Test  -  Resisted Long Finger Extension Pain +  Moving Valgus     -  Forearm pain with resisted supination -  Yeargeson' s (elbow pain)   -  Hook test     -    Wrist Examination:  See above noted areas of tenderness.   Finkelstein's Test    -  Tinel's Test - Carpal Tunnel   -  Phalen's Test     -  Median Nerve Compression Test  -  Ulnar-sided Compression Test  -  LT Ballottment Test    -  Snuff box tenderness    -  Bradford's  Test     -  LT Instability     -  Hook of Hamate Tenderness   -     Extremity Neuro-vascular Testing: Sensation grossly intact to light touch all dermatomal regions. DTR 2+ Biceps, Triceps, BR and Negative Lizzeth's sign. Grossly intact motor function at Elbow, Wrist and Hand. Distal pulses radial and ulnar 2+, brisk cap refill, symmetric.    Other Findings:    ASSESSMENT & PLAN   Assessment:   #1 lateral epicondylitis, right   S/p perc ten, CET of elbow, 19.01, AWG    No evidence of infection  No evidence of osseous pathology  No evidence of neurologic pathology  No evidence of vascular pathology    Imaging studies reviewed:   X-ray elbow, right 18.11    Plan:    Reassuring evaluation    We discussed options including:  #1 watchful waiting  #2 physical/occupational therapy aimed at:   RoM elbow   Eccentric strengthening common extensor musculature   Stretching common extensor tendon    Discussed, deferred by pt  #3 injection therapy:   CSI, origin of common extensor tendon of forearm     Right, effective 100%, repeat    Left,      Discussed the likely transient therapeutic effects of CSIs in the     treatment of epicondylitis  #4 perc tenotomy, common extensor tendon   Likely in 19.01     The patient chooses #2    Pain management required: handout given  Bracing required:    Tennis elbow strap, continue   Wrist immobilization brace, prn   Shoulder sling, d/c   Physical therapy required:    fPT, @ [facility], begin as above   Activity (e.g. sports, work) restrictions: as tolerated   school/vocation: member of [employer],      Follow up in 12 w, or sooner per pt  Should symptoms worsen or fail to resolve, consider:  Revisiting the above options

## 2019-01-18 ENCOUNTER — CLINICAL SUPPORT (OUTPATIENT)
Dept: REHABILITATION | Facility: HOSPITAL | Age: 54
End: 2019-01-18
Attending: INTERNAL MEDICINE
Payer: COMMERCIAL

## 2019-01-18 DIAGNOSIS — M25.521 PAIN IN RIGHT ELBOW: ICD-10-CM

## 2019-01-18 PROCEDURE — 97165 OT EVAL LOW COMPLEX 30 MIN: CPT

## 2019-01-18 PROCEDURE — 97110 THERAPEUTIC EXERCISES: CPT

## 2019-01-18 NOTE — PATIENT INSTRUCTIONS
Wrist Extensor Stretch        Keeping elbow straight, grasp left hand and slowly bend wrist forward until stretch is felt. Hold ____ seconds. Relax.  Repeat ____ times per set. Do ____ sets per session. Do ____ sessions per day.    Copyright © [employer]. All rights reserved.   AROM: Wrist Flexion / Extension        Actively bend right wrist forward then back as far as possible.  Repeat ____ times per set. Do ____ sets per session. Do ____ sessions per day.    Copyright © [employer]. All rights reserved.

## 2019-01-18 NOTE — PLAN OF CARE
[facility] Occupational Therapy  Initial Evaluation     Name: Sara Ferro  Clinic Number: [id number]    Therapy Diagnosis: Pain in right elbow  Physician: Hussein Tang MD    Physician Orders:     ROM elbow              Eccentric strengthening common extensor musculature              Stretching common extensor tendon    Medical Diagnosis: M77.11 (ICD-10-CM) - Right lateral epicondylitis     Evaluation Date: 2019  Insurance Authorization period Expiration: 2020     Plan of Care Certification Period: 19-3/18/19    Visit # / Visits Authortized:   Time In:9:02 a.m.  Time Out: 10:05  Total Billable Time: 40 minutes    Precautions: Standard    Subjective     Involved Side: Right  Dominant Side: Right  Date of Onset: >1 year  Mechanism of Injury: Insidious  History of Current Condition: Patient reports gradual onset of R elbow pain  Surgical Procedure: Tenex procedure  Imagin18 FINDINGS:  No fracture or dislocation.  No bone destruction identified  Previous Therapy: No    Patient's Goals for Therapy: Regain functional use of RUE without increased pain.    Pain:  Functional Pain Scale Rating 0-10:   6/10 on average  4/10 at best  8/10 at worst  Location: R extensor mass  Description: Sharp  Aggravating Factors: Lifting  Easing Factors: rest    Occupation:    Working presently: employed  Duties: Computer work    Functional Limitations/Social History:    Previous functional status includes: Independent with all ADLs.     Current FunctionalStatus   Home/Living environment : lives with their family      Limitation of Functional Status as follows:   ADLs/IADLs:     - Feeding: Independent    - Bathing: Independent    - Dressing/Grooming: Independent    - Driving: Independent     Leisure: Crafts      Past Medical History/Physical Systems Review:   Sara Ferro  has a past medical history of Fatty liver.    Sara Ferro  has a past  surgical history that includes Endometrial ablation;  section; Tubal ligation; Belt abdominoplasty; and Hysterectomy.    Sara currently has no medications in their medication list.    Review of patient's allergies indicates:  No Known Allergies       Objective     Active Range of Motion: Measured in degrees    Elbow Left Right   Flexion NT WNL   Extension NT -15   Pronation NT WNL   Supination NT WNL       Wrist Left Right   Flexion (elbow flex) NT 65   Extension NT 62   Flexion (elbow ext) NT 50               Strength: (ROSA Dynamometer in lbs.) Average 3 trials, Position II:     2019    Left Right   Elbow Flexed Deffered Deffered   Elbow Extended Deferred Deferred             CMS Impairment/Limitation/Restriction for FOTO Elbow Survey    Therapist reviewed FOTO scores for Sara Ferro on 2019.   FOTO documents entered into [employer] - see Media section.    Limitation Score: 45%  Category: Carrying    Current : CK = at least 40% but < 60% impaired, limited or restricted  Goal: CJ = at least 20% but < 40% impaired, limited or restricted         Treatment     Treatment Time In: 9:02am  Treatment Time Out: 10:05am  Total Treatment time separate from Evaluation time:40    Randell received the following supervised modalities after being cleared for contradictions for 15 minutes:   -Patient received MH x 15 min to R elbow to increase blood flow, circulation and tissue elasticity prior to therex            Randell received the following manual therapy techniques for 10 minutes:   -STM   -KT with 15% stretch from dorsum of wrist to lateral epicondyle with 85% correction over mobile wad    Randell received therapeutic exercises for 15 minutes including  -Phase 1 active stretches        Home Exercise Program/Education:  Issued HEP (see patient instructions in EMR) and educated on modality use for pain management . Exercises were reviewed and Randell was able to demonstrate them prior to the end  of the session.   Pt received a written copy of exercises to perform at home. Randell demonstrated good  understanding of the education provided.  Pt was advised to perform these exercises free of pain, and to stop performing them if pain occurs.    Patient/Family Education: role of OT, goals for OT, scheduling/cancellations - pt verbalized understanding. Discussed insurance limitations with patient.    Additional Education provided: Ergonomics, activities to avoid    Assessment     Sara Ferro is a 53 y.o. female referred to outpatient occupational/hand therapy and presents with a medical diagnosis of tennis elbow, resulting in functional deficits and demonstrates limitations as described in the chart below.      The patient's rehab potential is Good.     Anticipated barriers to occupational therapy: None  Pt has no cultural, educational or language barriers to learning provided.    Profile and History Assessment of Occupational Performance Level of Clinical Decision Making Complexity Score   Occupational Profile:   Sara Ferro is a 53 y.o. female who lives with their family and is currently employed as . Sara Ferro has difficulty with    housework/household chores  affecting his/her daily functional abilities. His/her main goal for therapy is pain free functional use of RUE.       Medical and Therapy History Review:   Brief               Performance Deficits    Physical:  Muscle Power/Strength,  ROM, Pain    Cognitive:  No Deficits    Psychosocial:    No Deficits     Clinical Decision Making:  low    Assessment Process:  Problem-Focused Assessments    Modification/Need for Assistance:  Not Necessary    Intervention Selection:  Limited Treatment Options       low  Based on PMHX, co morbidities , data from assessments and functional level of assistance required with task and clinical presentation directly impacting function.       The following goals were discussed  with the patient and patient is in agreement with them as to be addressed in the treatment plan.     Goals:   Short Term Goals (STGs); to be met within 2 weeks (2/1/19).  STG #1: Pt will report 3 out of 10 pain level with activity.  STG #4: Pt will demonstrate independence with issued HEP.       Long Term Goals (LTGs); to be met by discharge.  LTG #1: Pt will report a pain level of 1 out of 10 with activity   LTG #2: Pt will demo improved FOTO score by decreasing limitation to 35 points.   LTG #3: Pt will return to prior level of function for ADLs and household management.       Plan   Certification Period/Plan of care expiration: 1/18/2019 to 3/18/19.    Outpatient Occupational Therapy 2 times weekly for 8 weeks may include the following interventions: Manual therapy/joint mobilizations, Modalities for pain management, US 3 mhz, Therapeutic exercises/activities., Strengthening and Ergonomics.    Jeanna Fuller, OT      Kendy Ramon, OT

## 2019-01-24 ENCOUNTER — CLINICAL SUPPORT (OUTPATIENT)
Dept: REHABILITATION | Facility: HOSPITAL | Age: 54
End: 2019-01-24
Attending: INTERNAL MEDICINE
Payer: COMMERCIAL

## 2019-01-24 DIAGNOSIS — M25.521 PAIN IN RIGHT ELBOW: Primary | ICD-10-CM

## 2019-01-24 PROCEDURE — 97110 THERAPEUTIC EXERCISES: CPT

## 2019-01-24 NOTE — PROGRESS NOTES
Occupational Therapy Daily Treatment Note   Date 2019  Name: Sara Ferro  Clinic Number: [id number]     Therapy Diagnosis: Pain in right elbow  Physician: Dr Blanton     Physician Orders:     ROM elbow              Eccentric strengthening common extensor musculature              Stretching common extensor tendon     Medical Diagnosis: M77.11 (ICD-10-CM) - Right lateral epicondylitis      Evaluation Date: 2019  Insurance Authorization period Expiration: 2020      Plan of Care Certification Period: 19-3/18/19     Visit # / Visits Authortized:   Time In: 4:20 pm   Time Out: 5:00pm     Total Billable Time: 40 minutes     Precautions: Standard    Involved Side: Right  Dominant Side: Right  Date of Onset: >1 year  Mechanism of Injury: Insidious  History of Current Condition: Patient reports gradual onset of R elbow pain  Surgical Procedure: Tenex procedure 1/3/19   Imagin18 FINDINGS:  No fracture or dislocation.  No bone destruction identified       Occupation:    Working presently: employed  Duties: Computer work         Subjective     Pt reports: she was compliant with home exercise program given last session.   Response to previous treatment:pt reports that the KT helped the pain seemed less with the tape on   Functional change: not much change yet     Pain: 5/10  Location: right elbow      Objective   Randell received the following supervised modalities after being cleared for contradictions for 15 minutes:   -Patient received MH x 15 min to R elbow to increase blood flow, circulation and tissue elasticity prior to therex          Randell received the following manual therapy techniques for 10 minutes:   -STM   -KT with 15% stretch from dorsum of wrist to lateral epicondyle with 85% correction over wrist extensor  wad     Randell received therapeutic exercises for 15 minutes including  Passive  wrist flexion stretches  Dicussed with  patient that she needs to modify her work area and provided an ergonomic handout, and recommened that pt get a gel mouse pad for the computer and keyboard .     Home Exercises and Education Provided     Education provided:   -   - Progress towards goals: good     Written Home Exercises Provided: Patient instructed to cont prior HEP.  Exercises were reviewed and Randell was able to demonstrate them prior to the end of the session.  Randell demonstrated good  understanding of the education provided.   .     Assessment     Pt would continue to benefit from skilled OT. Yes      Randell is progressing well towards her goals and there are no updates to goals at this time. Pt prognosis is Excellent.     Pt will continue to benefit from skilled outpatient occupational therapy to address the deficits listed in the problem list on initial evaluation provide pt/family education and to maximize pt's level of independence in the home and community environment.     Anticipated barriers to occupational therapy: none     Pt's spiritual, cultural and educational needs considered and pt agreeable to plan of care and goals.      Goals:   Short Term Goals (STGs); to be met within 2 weeks (2/1/19).  STG #1: Pt will report 3 out of 10 pain level with activity.  STG #4: Pt will demonstrate independence with issued HEP.         Long Term Goals (LTGs); to be met by discharge.  LTG #1: Pt will report a pain level of 1 out of 10 with activity              LTG #2: Pt will demo improved FOTO score by decreasing limitation to 35 points.   LTG #3: Pt will return to prior level of function for ADLs and household management.             Plan     Discussed Plan of Care with patient: Yes  Updates/Grading for next session: no change       Radha Camilo, OT

## 2019-11-13 ENCOUNTER — OFFICE VISIT (OUTPATIENT)
Dept: FAMILY MEDICINE | Facility: CLINIC | Age: 54
End: 2019-11-13
Payer: COMMERCIAL

## 2019-11-13 VITALS
OXYGEN SATURATION: 97 % | HEART RATE: 109 BPM | BODY MASS INDEX: 31.68 KG/M2 | RESPIRATION RATE: 18 BRPM | SYSTOLIC BLOOD PRESSURE: 122 MMHG | TEMPERATURE: 99 F | WEIGHT: 172.19 LBS | DIASTOLIC BLOOD PRESSURE: 82 MMHG | HEIGHT: 62 IN

## 2019-11-13 DIAGNOSIS — R23.2 HOT FLASH NOT DUE TO MENOPAUSE: ICD-10-CM

## 2019-11-13 DIAGNOSIS — J01.40 ACUTE NON-RECURRENT PANSINUSITIS: Primary | ICD-10-CM

## 2019-11-13 DIAGNOSIS — E66.9 OBESITY (BMI 30.0-34.9): ICD-10-CM

## 2019-11-13 DIAGNOSIS — N95.1 VAGINAL DRYNESS, MENOPAUSAL: ICD-10-CM

## 2019-11-13 DIAGNOSIS — R05.9 COUGH: ICD-10-CM

## 2019-11-13 DIAGNOSIS — Z12.39 ENCOUNTER FOR BREAST CANCER SCREENING OTHER THAN MAMMOGRAM: ICD-10-CM

## 2019-11-13 DIAGNOSIS — R30.0 DYSURIA: ICD-10-CM

## 2019-11-13 DIAGNOSIS — J02.9 SORE THROAT: ICD-10-CM

## 2019-11-13 LAB
BILIRUB SERPL-MCNC: NEGATIVE MG/DL
BLOOD URINE, POC: NEGATIVE
COLOR, POC UA: YELLOW
GLUCOSE UR QL STRIP: NORMAL
KETONES UR QL STRIP: NEGATIVE
LEUKOCYTE ESTERASE URINE, POC: ABNORMAL
NITRITE, POC UA: NEGATIVE
PH, POC UA: 5
PROTEIN, POC: ABNORMAL
SPECIFIC GRAVITY, POC UA: 1.02
UROBILINOGEN, POC UA: 1

## 2019-11-13 PROCEDURE — 99999 PR PBB SHADOW E&M-EST. PATIENT-LVL IV: CPT | Mod: PBBFAC,,, | Performed by: NURSE PRACTITIONER

## 2019-11-13 PROCEDURE — 81002 URINALYSIS NONAUTO W/O SCOPE: CPT | Mod: S$GLB,,, | Performed by: NURSE PRACTITIONER

## 2019-11-13 PROCEDURE — 3008F PR BODY MASS INDEX (BMI) DOCUMENTED: ICD-10-PCS | Mod: CPTII,S$GLB,, | Performed by: NURSE PRACTITIONER

## 2019-11-13 PROCEDURE — 99214 PR OFFICE/OUTPT VISIT, EST, LEVL IV, 30-39 MIN: ICD-10-PCS | Mod: 25,S$GLB,, | Performed by: NURSE PRACTITIONER

## 2019-11-13 PROCEDURE — 3008F BODY MASS INDEX DOCD: CPT | Mod: CPTII,S$GLB,, | Performed by: NURSE PRACTITIONER

## 2019-11-13 PROCEDURE — 96372 PR INJECTION,THERAP/PROPH/DIAG2ST, IM OR SUBCUT: ICD-10-PCS | Mod: S$GLB,,, | Performed by: NURSE PRACTITIONER

## 2019-11-13 PROCEDURE — 96372 THER/PROPH/DIAG INJ SC/IM: CPT | Mod: S$GLB,,, | Performed by: NURSE PRACTITIONER

## 2019-11-13 PROCEDURE — 99999 PR PBB SHADOW E&M-EST. PATIENT-LVL IV: ICD-10-PCS | Mod: PBBFAC,,, | Performed by: NURSE PRACTITIONER

## 2019-11-13 PROCEDURE — 81002 POCT URINE DIPSTICK WITHOUT MICROSCOPE: ICD-10-PCS | Mod: S$GLB,,, | Performed by: NURSE PRACTITIONER

## 2019-11-13 PROCEDURE — 99214 OFFICE O/P EST MOD 30 MIN: CPT | Mod: 25,S$GLB,, | Performed by: NURSE PRACTITIONER

## 2019-11-13 RX ORDER — FLUTICASONE PROPIONATE 50 MCG
1 SPRAY, SUSPENSION (ML) NASAL DAILY
Qty: 1 BOTTLE | Refills: 2 | Status: SHIPPED | OUTPATIENT
Start: 2019-11-13 | End: 2023-05-02

## 2019-11-13 RX ORDER — PROMETHAZINE HYDROCHLORIDE AND DEXTROMETHORPHAN HYDROBROMIDE 6.25; 15 MG/5ML; MG/5ML
5 SYRUP ORAL 4 TIMES DAILY PRN
Qty: 118 ML | Refills: 1 | Status: SHIPPED | OUTPATIENT
Start: 2019-11-13 | End: 2019-11-23

## 2019-11-13 RX ORDER — BETAMETHASONE SODIUM PHOSPHATE AND BETAMETHASONE ACETATE 3; 3 MG/ML; MG/ML
6 INJECTION, SUSPENSION INTRA-ARTICULAR; INTRALESIONAL; INTRAMUSCULAR; SOFT TISSUE
Status: COMPLETED | OUTPATIENT
Start: 2019-11-13 | End: 2019-11-13

## 2019-11-13 RX ORDER — AMOXICILLIN AND CLAVULANATE POTASSIUM 875; 125 MG/1; MG/1
1 TABLET, FILM COATED ORAL EVERY 12 HOURS
Qty: 14 TABLET | Refills: 0 | Status: SHIPPED | OUTPATIENT
Start: 2019-11-13 | End: 2019-11-20

## 2019-11-13 RX ADMIN — BETAMETHASONE SODIUM PHOSPHATE AND BETAMETHASONE ACETATE 6 MG: 3; 3 INJECTION, SUSPENSION INTRA-ARTICULAR; INTRALESIONAL; INTRAMUSCULAR; SOFT TISSUE at 10:11

## 2019-11-13 NOTE — PROGRESS NOTES
Subjective:       Patient ID: Sara Ferro is a 53 y.o. female.    Chief Complaint: Sore Throat; Cough; and Urinary Frequency    URI    This is a new problem. The current episode started in the past 7 days. The problem has been gradually improving. There has been no fever. Associated symptoms include congestion, coughing, ear pain, headaches, sinus pain, sneezing and a sore throat. Pertinent negatives include no chest pain, diarrhea, joint pain or plugged ear sensation. She has tried decongestant for the symptoms. The treatment provided no relief.       Past Medical History:   Diagnosis Date    Fatty liver        Review of patient's allergies indicates:  No Known Allergies      Current Outpatient Medications:     (Magic mouthwash) 1:1:1 Benadryl 12.5mg/5ml liq, aluminum & magnesium hydroxide-simehticone (Maalox), lidocaine viscous 2%, Swish and spit 5 mLs every 4 (four) hours as needed. for mouth sores, Disp: 90 mL, Rfl: 1    amoxicillin-clavulanate 875-125mg (AUGMENTIN) 875-125 mg per tablet, Take 1 tablet by mouth every 12 (twelve) hours. for 7 days, Disp: 14 tablet, Rfl: 0    black cohosh 20 mg Tab, Take 20 mg by mouth once daily., Disp: , Rfl: 0    [START ON 11/14/2019] conjugated estrogens (PREMARIN) vaginal cream, Place 0.5 g vaginally twice a week., Disp: 1 applicator, Rfl: 1    fluticasone propionate (FLONASE) 50 mcg/actuation nasal spray, 1 spray (50 mcg total) by Each Nostril route once daily., Disp: 1 Bottle, Rfl: 2    promethazine-dextromethorphan (PROMETHAZINE-DM) 6.25-15 mg/5 mL Syrp, Take 5 mLs by mouth 4 (four) times daily as needed., Disp: 118 mL, Rfl: 1    Current Facility-Administered Medications:     betamethasone acetate-betamethasone sodium phosphate injection 6 mg, 6 mg, Intramuscular, 1 time in Clinic/HOD, Bobby Kumar NP    Review of Systems   HENT: Positive for congestion, ear pain, sinus pain, sneezing and sore throat.    Respiratory: Positive for cough.   "  Cardiovascular: Negative for chest pain.   Gastrointestinal: Negative for diarrhea.   Musculoskeletal: Negative for joint pain.   Neurological: Positive for headaches.       Objective:      /82 (BP Location: Right arm, Patient Position: Sitting, BP Method: Small (Manual))   Pulse 109   Temp 99 °F (37.2 °C) (Oral)   Resp 18   Ht 5' 2" (1.575 m)   Wt 78.1 kg (172 lb 2.9 oz)   SpO2 97%   BMI 31.49 kg/m²   Physical Exam   Constitutional: She is oriented to person, place, and time. She appears well-developed and well-nourished. She has a sickly appearance.   HENT:   Right Ear: Tympanic membrane is bulging.   Left Ear: Tympanic membrane is bulging.   Nose: Mucosal edema present. Right sinus exhibits maxillary sinus tenderness and frontal sinus tenderness. Left sinus exhibits maxillary sinus tenderness and frontal sinus tenderness.   Eyes: Pupils are equal, round, and reactive to light. EOM are normal.   Neck: Normal range of motion.   Cardiovascular: Normal rate, regular rhythm and normal heart sounds.   Pulmonary/Chest: Effort normal and breath sounds normal.   Abdominal: Soft. Bowel sounds are normal.   Musculoskeletal: Normal range of motion.   Neurological: She is alert and oriented to person, place, and time.   Skin: Skin is warm and dry.   Psychiatric: She has a normal mood and affect. Her behavior is normal. Judgment and thought content normal.       Assessment:       1. Acute non-recurrent pansinusitis    2. Sore throat    3. Dysuria    4. Cough    5. Hot flash not due to menopause    6. Vaginal dryness, menopausal    7. Encounter for breast cancer screening other than mammogram    8. Obesity (BMI 30.0-34.9)        Plan:       Acute non-recurrent pansinusitis  -     amoxicillin-clavulanate 875-125mg (AUGMENTIN) 875-125 mg per tablet; Take 1 tablet by mouth every 12 (twelve) hours. for 7 days  Dispense: 14 tablet; Refill: 0  -     betamethasone acetate-betamethasone sodium phosphate injection 6 mg  - " "    fluticasone propionate (FLONASE) 50 mcg/actuation nasal spray; 1 spray (50 mcg total) by Each Nostril route once daily.  Dispense: 1 Bottle; Refill: 2    Sore throat  -     (Magic mouthwash) 1:1:1 Benadryl 12.5mg/5ml liq, aluminum & magnesium hydroxide-simehticone (Maalox), lidocaine viscous 2%; Swish and spit 5 mLs every 4 (four) hours as needed. for mouth sores  Dispense: 90 mL; Refill: 1    Dysuria  -     POCT URINE DIPSTICK WITHOUT MICROSCOPE    Cough  -     promethazine-dextromethorphan (PROMETHAZINE-DM) 6.25-15 mg/5 mL Syrp; Take 5 mLs by mouth 4 (four) times daily as needed.  Dispense: 118 mL; Refill: 1    Hot flash not due to menopause  -     black cohosh 20 mg Tab; Take 20 mg by mouth once daily.; Refill: 0    Vaginal dryness, menopausal  -     conjugated estrogens (PREMARIN) vaginal cream; Place 0.5 g vaginally twice a week.  Dispense: 1 applicator; Refill: 1    Encounter for breast cancer screening other than mammogram  -     Mammo Digital Screening Bilat; Future; Expected date: 11/13/2019    Obesity (BMI 30.0-34.9)  Counseled patient on his ideal body weight, health consequences of being obese and current recommendations including weekly exercise and a heart healthy diet.  Current BMI is:Estimated body mass index is 31.49 kg/m² as calculated from the following:    Height as of this encounter: 5' 2" (1.575 m).    Weight as of this encounter: 78.1 kg (172 lb 2.9 oz)..  Patient is aware that ideal BMI < 25 or Weight in (lb) to have BMI = 25: 136.4.              Patient readiness: acceptance and barriers:none    During the course of the visit the patient was educated and counseled about the following:     Obesity:   General weight loss/lifestyle modification strategies discussed (elicit support from others; identify saboteurs; non-food rewards, etc).  Regular aerobic exercise program discussed.    Goals: Obesity: Reduce calorie intake and BMI    Did patient meet goals/outcomes: No    The following self " management tools provided: declined    Patient Instructions (the written plan) was given to the patient/family.     Time spent with patient: 30 minutes    Barriers to medications present (no )    Adverse reactions to current medications (no)    Over the counter medications reviewed (Yes)

## 2019-11-13 NOTE — PROGRESS NOTES
2 Patent identifiers used (name and ). Administered 6mg Celestone IM. Patient tolerated well. No bleeding at insertion site noted. Pain 0 on scale 0/10. Aseptic technique maintained.

## 2019-11-13 NOTE — PATIENT INSTRUCTIONS
Understanding Menopause  Menopause marks the point where youve gone 12 months in a row without a period. The average age for this is around 51, but it can happen at younger or older ages. During the months or years before menopause, your body goes through many changes. It may be helpful to understand these changes and what you can do about the symptoms that result.     Use a portable fan to help stay cool.    Symptoms  Perimenopause is sometimes called the menopause transition. It happens in the months or years before menopause. It may begin when you reach your mid-40s. During this time, your estrogen levels go up and down and then decrease. As a result, you may notice some of the following symptoms:  · Menstrual periods that come more or less often than usual  · Menstrual periods that are lighter or heavier than normal  · Increased premenstrual syndrome (PMS) symptoms  · Hot flashes  · Night sweats  · Mood swings  · Vaginal dryness with possible painful sexual activity  · Difficulty going to sleep or staying asleep  · Decreased sexual drive and function  · Urinating frequently  It is important to remember that you could become pregnant until 12 months have passed since your last menstrual period. Ask your healthcare provider about birth control choices.   Controlling symptoms  Your healthcare provider may suggest pills or an intrauterine device (IUD) that contain the hormone progesterone. This can make your periods more regular and prevent excess bleeding. If you have symptoms due to lower estrogen levels, your healthcare provider may suggest pills that contain estrogen and/or progesterone. This is called hormone therapy (HT).  There are also other prescription medicines that help control some of the bothersome symptoms, like hot flashes, mood swings, and vaginal dryness.  Other ways for you to deal with symptoms are listed below.  · Hot flashes. Wear layers that you can remove. Try all-cotton clothing, sheets,  and blankets. Keep a glass of cold water by your bed.  · Pain during sex. You can buy a water-based lubricant or vaginal moisturizer in the drugstore that may help. Your healthcare provider may also prescribe an estrogen cream for your vagina.  · Mood swings. Talking to friends who are going through the same changes can sometimes help.  Date Last Reviewed: 12/1/2016  © 6915-1585 [employer]. [address] [place] [address], [place] [address]. All rights reserved. This information is not intended as a substitute for professional medical care. Always follow your healthcare professional's instructions.        Sinusitis (Antibiotic Treatment)    The sinuses are air-filled spaces within the bones of the face. They connect to the inside of the nose. Sinusitis is an inflammation of the tissue lining the sinus cavity. Sinus inflammation can occur during a cold. It can also be due to allergies to pollens and other particles in the air. Sinusitis can cause symptoms of sinus congestion and fullness. A sinus infection causes fever, headache and facial pain. There is often green or yellow drainage from the nose or into the back of the throat (post-nasal drip). You have been given antibiotics to treat this condition.  Home care:  · Take the full course of antibiotics as instructed. Do not stop taking them, even if you feel better.  · Drink plenty of water, hot tea, and other liquids. This may help thin mucus. It also may promote sinus drainage.  · Heat may help soothe painful areas of the face. Use a towel soaked in hot water. Or,  the shower and direct the hot spray onto your face. Using a vaporizer along with a menthol rub at night may also help.   · An expectorant containing guaifenesin may help thin the mucus and promote drainage from the sinuses.  · Over-the-counter decongestants may be used unless a similar medicine was prescribed. Nasal sprays work the fastest. Use one that contains phenylephrine or  oxymetazoline. First blow the nose gently. Then use the spray. Do not use these medicines more often than directed on the label or symptoms may get worse. You may also use tablets containing pseudoephedrine. Avoid products that combine ingredients, because side effects may be increased. Read labels. You can also ask the pharmacist for help. (NOTE: Persons with high blood pressure should not use decongestants. They can raise blood pressure.)  · Over-the-counter antihistamines may help if allergies contributed to your sinusitis.    · Do not use nasal rinses or irrigation during an acute sinus infection, unless told to by your health care provider. Rinsing may spread the infection to other sinuses.  · Use acetaminophen or ibuprofen to control pain, unless another pain medicine was prescribed. (If you have chronic liver or kidney disease or ever had a stomach ulcer, talk with your doctor before using these medicines. Aspirin should never be used in anyone under 18 years of age who is ill with a fever. It may cause severe liver damage.)  · Don't smoke. This can worsen symptoms.  Follow-up care  Follow up with your healthcare provider or our staff if you are not improving within the next week.  When to seek medical advice  Call your healthcare provider if any of these occur:  · Facial pain or headache becoming more severe  · Stiff neck  · Unusual drowsiness or confusion  · Swelling of the forehead or eyelids  · Vision problems, including blurred or double vision  · Fever of 100.4ºF (38ºC) or higher, or as directed by your healthcare provider  · Seizure  · Breathing problems  · Symptoms not resolving within 10 days  Date Last Reviewed: 4/13/2015 © 2000-2017 [employer]. [address] [place] [address], [place] [address]. All rights reserved. This information is not intended as a substitute for professional medical care. Always follow your healthcare professional's instructions.        Prevention Guidelines, Women  Ages 50 to 64  Screening tests and vaccines are an important part of managing your health. Health counseling is essential, too. Below are guidelines for these, for women ages 50 to 64. Talk with your healthcare provider to make sure youre up to date on what you need.  Screening Who needs it How often   Type 2 diabetes or prediabetes All adults beginning at age 45 and adults without symptoms at any age who are overweight or obese and have 1 or more additional risk factors for diabetes. At  least every 3 years   Alcohol misuse All women in this age group At routine exams   Blood pressure All women in this age group Every 2 years if your blood pressure is less than 120/80 mm Hg; yearly if your systolic blood pressure is 120 to 139 mm Hg, or your diastolic blood pressure reading is 80 to 89 mm Hg   Breast cancer All women in this age group Yearly mammogram and clinical breast exam1   Cervical cancer All women in this age group, except women who have had a complete hysterectomy Pap test every 3 years or Pap test with human papillomavirus (HPV) test every 5 years   Chlamydia Women at increased risk for infection At routine exams   Colorectal cancer All women in this age group Flexible sigmoidoscopy every 5 years, or colonoscopy every 10 years, or double-contrast barium enema every 5 years; yearly fecal occult blood test or fecal immunochemical test; or a stool DNA test as often as your health care provider advises; talk with your health care provider about which tests are best for you   Depression All women in this age group At routine exams   Gonorrhea Sexually active women at increased risk for infection At routine exams   Hepatitis C Anyone at increased risk; 1 time for those born between 1945 and 1965 At routine exams   High cholesterol or triglycerides All women in this age group who are at risk for coronary artery disease At least every 5 years   HIV All women At routine exams   Lung cancer Adults age 55 to 80 who  have smoked Yearly screening in smokers with 30 pack-year history of smoking or who quit within 15 years   Obesity All women in this age group At routine exams   Osteoporosis Women who are postmenopausal Ask your healthcare provider   Syphilis Women at increased risk for infection - talk with your healthcare provider At routine exams   Tuberculosis Women at increased risk for infection - talk with your healthcare provider Ask your healthcare provider   Vision All women in this age group Ask your healthcare provider   Vaccine Who needs it How often   Chickenpox (varicella) All women in this age group who have no record of this infection or vaccine 2 doses; the second dose should be given at least 4 weeks after the first dose   Hepatitis A Women at increased risk for infection - talk with your healthcare provider 2 doses given at least 6 months apart   Hepatitis B Women at increased risk for infection - talk with your healthcare provider 3 doses over 6 months; second dose should be given 1 month after the first dose; the third dose should be given at least 2 months after the second dose and at least 4 months after the first dose   Haemophilus influenzaeType B (HIB) Women at increased risk for infection - talk with your healthcare provider 1 to 3 doses   Influenza (flu) All women in this age group Once a year   Measles, mumps, rubella (MMR) Women in this age group through their late 50s who have no record of these infections or vaccines 1 dose   Meningococcal Women at increased risk for infection - talk with your healthcare provider 1 or more doses   Pneumococcal conjugate vaccine (PCV13) and pneumococcal polysaccharide vaccine (PPSV23) Women at increased risk for infection - talk with your healthcare provider PCV13: 1 dose ages 19 to 65 (protects against 13 types of pneumococcal bacteria)  PPSV23: 1 to 2 doses through age 64, or 1 dose at 65 or older (protects against 23 types of pneumococcal bacteria)    Tetanus/diphtheria/pertussis (Td/Tdap) booster All women in this age group Td every 10 years, or a one-time dose of Tdap instead of a Td booster after age 18, then Td every 10 years   Zoster All women ages 60 and older 1 dose   Counseling Who needs it How often   BRCA gene mutation testing for breast and ovarian cancer susceptibility Women with increased risk for having gene mutation When your risk is known   Breast cancer and chemoprevention Women at high risk for breast cancer When your risk is known   Diet and exercise Women who are overweight or obese When diagnosed, and then at routine exams   Sexually transmitted infection prevention Women at increased risk for infection - talk with your healthcare provider At routine exams   Use of daily aspirin Women ages 55 and up in this age group who are at risk for cardiovascular health problems such as stroke When your risk is known   Use of tobacco and the health effects it can cause All women in this age group Every exam   1[employer]  Date Last Reviewed: 1/26/2016 © 2000-2017 [employer]. [address] [place] [address]. All rights reserved. This information is not intended as a substitute for professional medical care. Always follow your healthcare professional's instructions.        Self-Care for Sore Throats    Sore throats happen for many reasons, such as colds, allergies, and infections caused by viruses or bacteria. In any case, your throat becomes red and sore. Your goal for self-care is to reduce your discomfort while giving your throat a chance to heal.  Moisten and soothe your throat  Tips include the following:  · Try a sip of water first thing after waking up.  · Keep your throat moist by drinking 6 or more glasses of clear liquids every day.  · Run a cool-air humidifier in your room overnight.  · Avoid cigarette smoke.   · Suck on throat lozenges, cough drops, hard candy, ice chips, or frozen fruit-juice bars. Use  the sugar-free versions if your diet or medical condition requires them.  Gargle to ease irritation  Gargling every hour or 2 can ease irritation. Try gargling with 1 of these solutions:  · 1/4 teaspoon of salt in 1/2 cup of warm water  · An over-the-counter anesthetic gargle  Use medicine for more relief  Over-the-counter medicine can reduce sore throat symptoms. Ask your pharmacist if you have questions about which medicine to use:  · Ease pain with anesthetic sprays. Aspirin or an aspirin substitute also helps. Remember, never give aspirin to anyone 18 or younger, or if you are already taking blood thinners.   · For sore throats caused by allergies, try antihistamines to block the allergic reaction.  · Remember: unless a sore throat is caused by a bacterial infection, antibiotics wont help you.  Prevent future sore throats  Prevention tips include the following:  · Stop smoking or reduce contact with secondhand smoke. Smoke irritates the tender throat lining.  · Limit contact with pets and with allergy-causing substances, such as pollen and mold.  · When youre around someone with a sore throat or cold, wash your hands often to keep viruses or bacteria from spreading.  · Dont strain your vocal cords.  Call your healthcare provider  Contact your healthcare provider if you have:  · A temperature over 101°F (38.3°C)  · White spots on the throat  · Great difficulty swallowing  · Trouble breathing  · A skin rash  · Recent exposure to someone else with strep bacteria  · Severe hoarseness and swollen glands in the neck or jaw   Date Last Reviewed: 8/1/2016  © 4254-5836 [employer]. [address] [place] [address], [place] [address]. All rights reserved. This information is not intended as a substitute for professional medical care. Always follow your healthcare professional's instructions.

## 2020-01-22 ENCOUNTER — TELEPHONE (OUTPATIENT)
Dept: FAMILY MEDICINE | Facility: CLINIC | Age: 55
End: 2020-01-22

## 2020-01-22 NOTE — TELEPHONE ENCOUNTER
----- Message from Edwige  sent at 1/22/2020  8:44 AM CST -----  Contact: spouse  Type: Needs Medical Advice    Who Called:      Best Call Back Number:     Additional Information: Requesting a call back from Nurse, regarding access to an appt for TODAY pt is having bad back pain and may have pulled a muscle ,please call back with advice on number provided

## 2020-01-22 NOTE — TELEPHONE ENCOUNTER
Patient reports experiencing back pain x's 2 days. States she did not  anything heavy, believes she may have slept in a bad position (but unsure of cause). Appointment scheduled for tomorrow's date (1-). Patient agreed to appointment date and time.

## 2020-06-09 ENCOUNTER — OFFICE VISIT (OUTPATIENT)
Dept: DERMATOLOGY | Facility: CLINIC | Age: 55
End: 2020-06-09
Payer: COMMERCIAL

## 2020-06-09 VITALS — WEIGHT: 172.19 LBS | HEIGHT: 62 IN | BODY MASS INDEX: 31.68 KG/M2

## 2020-06-09 DIAGNOSIS — L30.9 ECZEMA, UNSPECIFIED TYPE: ICD-10-CM

## 2020-06-09 DIAGNOSIS — L82.1 SEBORRHEIC KERATOSES: ICD-10-CM

## 2020-06-09 DIAGNOSIS — L81.4 LENTIGO: Primary | ICD-10-CM

## 2020-06-09 DIAGNOSIS — Z12.83 SKIN CANCER SCREENING: ICD-10-CM

## 2020-06-09 DIAGNOSIS — R23.4 SKIN TEXTURE CHANGES: ICD-10-CM

## 2020-06-09 PROCEDURE — 99203 OFFICE O/P NEW LOW 30 MIN: CPT | Mod: S$GLB,,, | Performed by: DERMATOLOGY

## 2020-06-09 PROCEDURE — 99999 PR PBB SHADOW E&M-EST. PATIENT-LVL III: ICD-10-PCS | Mod: PBBFAC,,, | Performed by: DERMATOLOGY

## 2020-06-09 PROCEDURE — 99203 PR OFFICE/OUTPT VISIT, NEW, LEVL III, 30-44 MIN: ICD-10-PCS | Mod: S$GLB,,, | Performed by: DERMATOLOGY

## 2020-06-09 PROCEDURE — 99999 PR PBB SHADOW E&M-EST. PATIENT-LVL III: CPT | Mod: PBBFAC,,, | Performed by: DERMATOLOGY

## 2020-06-09 PROCEDURE — 3008F PR BODY MASS INDEX (BMI) DOCUMENTED: ICD-10-PCS | Mod: CPTII,S$GLB,, | Performed by: DERMATOLOGY

## 2020-06-09 PROCEDURE — 3008F BODY MASS INDEX DOCD: CPT | Mod: CPTII,S$GLB,, | Performed by: DERMATOLOGY

## 2020-06-09 RX ORDER — TRETINOIN 1 MG/G
CREAM TOPICAL NIGHTLY
Qty: 20 G | Refills: 3 | Status: SHIPPED | OUTPATIENT
Start: 2020-06-09 | End: 2023-05-02

## 2020-06-09 RX ORDER — TRIAMCINOLONE ACETONIDE 1 MG/G
CREAM TOPICAL 2 TIMES DAILY
Qty: 30 G | Refills: 0 | Status: SHIPPED | OUTPATIENT
Start: 2020-06-09 | End: 2023-05-02

## 2020-06-09 NOTE — PROGRESS NOTES
Subjective:       Patient ID:  Sara Ferro is a 54 y.o. female who presents for   Chief Complaint   Patient presents with    Spot     New Patient   54 y.o. female who presents for spots on her face that keep popping up.   Uses facial peel.     Would like a mole check.   Rash on lower leg    Derm HX: Denies any fhx or phx of skin CA      Review of Systems   Constitutional: Negative for fever, chills and fatigue.   Respiratory: Negative for cough and shortness of breath.    Skin: Positive for daily sunscreen use and activity-related sunscreen use. Negative for itching, rash, dry skin and sun sensitivity.   Hematologic/Lymphatic: Does not bruise/bleed easily.      Past Medical History:   Diagnosis Date    Fatty liver        Objective:    Physical Exam   Constitutional: She appears well-developed and well-nourished. No distress.   HENT:   Mouth/Throat: Lips normal.    Eyes: Lids are normal.    Cardiovascular: There is no local extremity swelling and no dependent edema.     Neurological: She is alert and oriented to person, place, and time. She is not disoriented.   Psychiatric: She has a normal mood and affect. She is not agitated.   Skin:   Areas Examined (abnormalities noted in diagram):   Scalp / Hair Palpated and Inspected  Head / Face Inspection Performed  Neck Inspection Performed  Chest / Axilla Inspection Performed  Abdomen Inspection Performed  Back Inspection Performed  RUE Inspected  LUE Inspection Performed  RLE Inspected  LLE Inspection Performed                   Diagram Legend     Erythematous scaling macule/papule c/w actinic keratosis       Vascular papule c/w angioma      Pigmented verrucoid papule/plaque c/w seborrheic keratosis      Yellow umbilicated papule c/w sebaceous hyperplasia      Irregularly shaped tan macule c/w lentigo     1-2 mm smooth white papules consistent with Milia      Movable subcutaneous cyst with punctum c/w epidermal inclusion cyst      Subcutaneous movable cyst  c/w pilar cyst      Firm pink to brown papule c/w dermatofibroma      Pedunculated fleshy papule(s) c/w skin tag(s)      Evenly pigmented macule c/w junctional nevus     Mildly variegated pigmented, slightly irregular-bordered macule c/w mildly atypical nevus      Flesh colored to evenly pigmented papule c/w intradermal nevus       Pink pearly papule/plaque c/w basal cell carcinoma      Erythematous hyperkeratotic cursted plaque c/w SCC      Surgical scar with no sign of skin cancer recurrence      Open and closed comedones      Inflammatory papules and pustules      Verrucoid papule consistent consistent with wart     Erythematous eczematous patches and plaques     Dystrophic onycholytic nail with subungual debris c/w onychomycosis     Umbilicated papule    Erythematous-base heme-crusted tan verrucoid plaque consistent with inflamed seborrheic keratosis     Erythematous Silvery Scaling Plaque c/w Psoriasis     See annotation      Assessment / Plan:        Lentigo  Discussed with patient the benign nature of these lesions and that no treatment is indicated.    Patient instructed in importance in daily sun protection. Sun avoidance and topical protection discussed.     Patient encouraged to wear hat for all outdoor exposure.     Also discussed sun protective clothing.  otc bleaching creams prn.    Eczema, unspecified type  -     triamcinolone acetonide 0.1% (KENALOG) 0.1 % cream; Apply topically 2 (two) times daily.  Dispense: 30 g; Refill: 0   mild.  Discussed with patient the etiology and pathogenesis of the disease or skin lesion(s) and possible treatments and aggravators.    Reviewed with patient different treatment options and associated risks.  Proper application of medications and or care for affected area(s) and condition(s) reviewed.  Patient to watch for recurrence or flares or worsening and to call the clinic for a follow up appointment for such.    Seborrheic keratoses  Discussed with patient the benign  nature of these lesions and that no treatment is indicated.      Skin texture changes  -     tretinoin (RETIN-A) 0.1 % cream; Apply topically every evening. Watch for skin irritation and dryness.  Dispense: 20 g; Refill: 3  Per pt request, retin a.    Skin cancer screening  No other seriously suspicious lesions noted for body areas examined today.  Patient to inform of us if they notice any dark or changing or suspicious spots in areas not examined today.  Follow up for routine monitoring recommended to patient.    Instructed patient to watch out for dark spots, bleeding spots, crusty spots, sores that break out repeatedly in the same spot.  These characteristics are risk factors for skin cancer, and patient is to notify us if they experience any of these symptoms.             Follow up in about 1 year (around 6/9/2021).

## 2020-10-14 ENCOUNTER — PATIENT MESSAGE (OUTPATIENT)
Dept: GASTROENTEROLOGY | Facility: CLINIC | Age: 55
End: 2020-10-14

## 2020-10-14 ENCOUNTER — OFFICE VISIT (OUTPATIENT)
Dept: FAMILY MEDICINE | Facility: CLINIC | Age: 55
End: 2020-10-14
Payer: COMMERCIAL

## 2020-10-14 VITALS
HEIGHT: 62 IN | OXYGEN SATURATION: 98 % | SYSTOLIC BLOOD PRESSURE: 108 MMHG | DIASTOLIC BLOOD PRESSURE: 72 MMHG | TEMPERATURE: 98 F | HEART RATE: 88 BPM | BODY MASS INDEX: 31.32 KG/M2 | WEIGHT: 170.19 LBS | RESPIRATION RATE: 16 BRPM

## 2020-10-14 DIAGNOSIS — M75.42 IMPINGEMENT SYNDROME OF LEFT SHOULDER: ICD-10-CM

## 2020-10-14 DIAGNOSIS — Z00.00 ENCOUNTER FOR PREVENTIVE HEALTH EXAMINATION: Primary | ICD-10-CM

## 2020-10-14 DIAGNOSIS — B37.0 ORAL THRUSH: ICD-10-CM

## 2020-10-14 DIAGNOSIS — Z12.11 COLON CANCER SCREENING: ICD-10-CM

## 2020-10-14 PROCEDURE — 99999 PR PBB SHADOW E&M-EST. PATIENT-LVL V: CPT | Mod: PBBFAC,,, | Performed by: STUDENT IN AN ORGANIZED HEALTH CARE EDUCATION/TRAINING PROGRAM

## 2020-10-14 PROCEDURE — 3008F BODY MASS INDEX DOCD: CPT | Mod: CPTII,S$GLB,, | Performed by: STUDENT IN AN ORGANIZED HEALTH CARE EDUCATION/TRAINING PROGRAM

## 2020-10-14 PROCEDURE — 3008F PR BODY MASS INDEX (BMI) DOCUMENTED: ICD-10-PCS | Mod: CPTII,S$GLB,, | Performed by: STUDENT IN AN ORGANIZED HEALTH CARE EDUCATION/TRAINING PROGRAM

## 2020-10-14 PROCEDURE — 99999 PR PBB SHADOW E&M-EST. PATIENT-LVL V: ICD-10-PCS | Mod: PBBFAC,,, | Performed by: STUDENT IN AN ORGANIZED HEALTH CARE EDUCATION/TRAINING PROGRAM

## 2020-10-14 PROCEDURE — 99214 OFFICE O/P EST MOD 30 MIN: CPT | Mod: S$GLB,,, | Performed by: STUDENT IN AN ORGANIZED HEALTH CARE EDUCATION/TRAINING PROGRAM

## 2020-10-14 PROCEDURE — 99214 PR OFFICE/OUTPT VISIT, EST, LEVL IV, 30-39 MIN: ICD-10-PCS | Mod: S$GLB,,, | Performed by: STUDENT IN AN ORGANIZED HEALTH CARE EDUCATION/TRAINING PROGRAM

## 2020-10-14 RX ORDER — SULINDAC 200 MG/1
200 TABLET ORAL 2 TIMES DAILY
Qty: 60 TABLET | Refills: 0 | Status: SHIPPED | OUTPATIENT
Start: 2020-10-14 | End: 2020-11-13

## 2020-10-14 RX ORDER — NYSTATIN 100000 [USP'U]/ML
4 SUSPENSION ORAL 4 TIMES DAILY
Qty: 160 ML | Refills: 0 | Status: SHIPPED | OUTPATIENT
Start: 2020-10-14 | End: 2020-10-24

## 2020-10-14 RX ORDER — SULINDAC 200 MG/1
200 TABLET ORAL 2 TIMES DAILY
Qty: 60 TABLET | Refills: 0 | Status: SHIPPED | OUTPATIENT
Start: 2020-10-14 | End: 2020-10-14 | Stop reason: SDUPTHER

## 2020-10-14 RX ORDER — NYSTATIN 100000 [USP'U]/ML
4 SUSPENSION ORAL 4 TIMES DAILY
Qty: 160 ML | Refills: 0 | Status: SHIPPED | OUTPATIENT
Start: 2020-10-14 | End: 2020-10-14 | Stop reason: SDUPTHER

## 2020-10-14 NOTE — PATIENT INSTRUCTIONS
Nonsurgical Treatment Options for Shoulder Impingement    Rest is key to healing your shoulder. If an activity hurts, dont do it. Otherwise, you may prevent healing and increase pain. Your shoulder needs active rest. This means avoiding overhead movements and activities that cause pain. But DO NOT stop using your shoulder completely. This can cause it to stiffen or freeze. In addition to rest, impingement can be treated a number of ways. Your healthcare provider can help you find which of these is best for you.  A physical therapist can also help you with exercises specific for your condition.  ? Ice  Ice reduces inflammation and relieves pain. Apply an ice pack for about 15 minutes, 3 times a day. You can also use a bag of frozen peas instead of an ice pack. A pillow placed under your arm may help make you more comfortable.  Note: Dont put the cold item directly on your skin. Place it on top of your shirt, or wrap it in a thin towel or washcloth.  ? Heat  Heat may soothe aching muscles, but it wont reduce inflammation. Use a heating pad or take a warm shower or bath. Do this for 15 minutes at a time.  Note: Avoid heat when pain is constant. Heat is best when used for warming up before an activity. You can also alternate ice and heat.  ? Medicine  To relieve pain and inflammation, try over-the-counter pain relievers, such as acetaminophen or ibuprofen. Or, your healthcare provider may prescribe medicines. Ask how and when to take your medicine. Be sure to follow all instructions youre given.  ? Electrical stimulation  Electrical stimulation can help reduce pain and swelling. Your healthcare provider attaches small pads to your shoulder. A mild electric current then flows into your shoulder. You may feel tingling, but you should not feel pain.  ? Ultrasound  Ultrasound can help reduce pain. First a slick gel or medicated cream is applied to your shoulder. Then your healthcare provider places a small device  over the area. The device uses sound waves to loosen shoulder tightness. This treatment should be pain-free.  ? Injection therapy  Injection therapy may be used to help diagnose your problem. It may also be used to reduce pain and inflammation. The injection typically includes two medicines. One is an anesthetic to numb the shoulder. The other is a steroid, such as cortisone, to help reduce painful swelling. It can take from a few hours to a couple of days before the injection helps. Talk to your healthcare provider about the possible risks and benefits of this therapy.  Date Last Reviewed: 10/14/2015  © 7385-7603 [employer]. [address] [place] [address], [place] [address]. All rights reserved. This information is not intended as a substitute for professional medical care. Always follow your healthcare professional's instructions.

## 2020-10-14 NOTE — PROGRESS NOTES
[facility]  Subjective:    Chief Complaint:   Chief Complaint   Patient presents with    Annual Exam    Shoulder Pain       History of Present Illness:  54 y.o. female presents for multiple issues.      Shoulder impingement syndrome-anterior pain poorly control and will start sulindac along with physical therapy for restrain thinning exercises in educated on process and to avoid overhead activities and will reassess and if not improved will do steroid injection for symptomatic improvement.  Constant left shoulder pain present for the last 3 weeks as worse with overhead activity and abduction above 90° with no weakness and pain is sharp goes down her left arm when she moves her left and above 90°.  No chest pain or shortness of breath.  Movement makes this worse.     oral thrush-she noticed that she burned her tongue and some white fundi in her tongue and no other ulcers.  Will give oral nystatin get HIV   Fatty liver-stable and will get LFTs today and advise on diet exercise    preventive care-will get colonoscopy and mammogram up-to-date on others.  She does not want vaccinations    This is the extent of the patient's complaints at this present time.     She denies chest pain upon exertion, dyspnea, nausea, vomiting, diaphoresis, and syncope. No pleuritic chest pain, unilateral leg swelling, calf tenderness, or calf pain.     She denies having a family history of cancer.     The following portions of the patient's history were reviewed and updated as appropriate: allergies, current medications, past family history, past medical history, past social history, past surgical history and problem list.    Review of Systems [Negative unless checked off]  Gen ROS: []fever []chills []weight loss []malaise []fatigue   Neuro: []dizzy []numbness []LOC []weakness []headaches   Psych ROS: []hallucinate [] depression []anxiety []suicidal ideation    ENT ROS: []congestion []rhinorrhea []sore throat  "[]neck pain []hearing loss   Eye ROS: []hazy vision [] diplopia []photophobia []eye pain    Pulm ROS: []cough []pleuritic pain []dyspnea []wheezing    [facility] : []chest pain []SOB on exertion []orthopnea []PND []leg swelling   GI ROS: []n/v []abd pain []diarrhea []constipation []blood/black stool   Uro ROS: []dysuria []frequency []flank pain [] trouble voiding []hematuria   MSK ROS: []myalgias [x]joint pain []neck pain []back pain [] falls   Derm ROS: []pruritis []rash []jaundice        Past Medical History:   Diagnosis Date    Fatty liver      Past Surgical History:   Procedure Laterality Date    BELT ABDOMINOPLASTY       SECTION      x2    ENDOMETRIAL ABLATION      HYSTERECTOMY      TUBAL LIGATION       Social History  Social History     Tobacco Use    Smoking status: Never Smoker    Smokeless tobacco: Never Used   Substance Use Topics    Alcohol use: Yes     Alcohol/week: 0.0 standard drinks     Comment: socially    Drug use: No     Family History   Problem Relation Age of Onset    Breast cancer Cousin     Cancer Father         prostate    Diabetes Mother     Heart disease Mother     Breast cancer Maternal Aunt     Ovarian cancer Neg Hx     Melanoma Neg Hx      Review of patient's allergies indicates:  No Known Allergies    Physical Examination  /72 (BP Location: Right arm, Patient Position: Sitting, BP Method: Medium (Manual))   Pulse 88   Temp 97.7 °F (36.5 °C) (Temporal)   Resp 16   Ht 5' 2" (1.575 m)   Wt 77.2 kg (170 lb 3.1 oz)   SpO2 98%   BMI 31.13 kg/m²   Wt Readings from Last 3 Encounters:   10/14/20 77.2 kg (170 lb 3.1 oz)   20 78.1 kg (172 lb 2.9 oz)   19 78.1 kg (172 lb 2.9 oz)     BP Readings from Last 3 Encounters:   10/14/20 108/72   19 122/82   18 110/82     Estimated body mass index is 31.13 kg/m² as calculated from the following:    Height as of this encounter: 5' 2" (1.575 m).    Weight as of this encounter: 77.2 kg (170 lb 3.1 oz). "     General appearance: alert, cooperative, no distress  Eyes: pupils equal and reactive, extraocular eye movements intact   Ears: bilateral TM's and external ear canals normal   Nose: normal and patent, no erythema, discharge or polyps   Sinuses: Normal paranasal sinuses without tenderness   Throat: mucous membranes moist, pharynx normal without lesions   Neck: no thyromegaly, trachea midline  Lungs: clear to auscultation, no wheezes, rales or rhonchi, symmetric air entry, no dullness to percussion bilaterally.  Heart: normal rate, regular rhythm, normal S1, S2, no murmurs, rubs, clicks or gallops, no displacement of the PMI.  Abdomen: soft, nontender, nondistended, no masses or organomegaly No rigidity, rebound, or guarding.   Back: full range of motion, no tenderness, palpable spasm or pain on motion   Extremities: peripheral pulses normal, no pedal edema, no clubbing or cyanosis   Feet: warm, good capillary refill.  Neurological:alert, oriented, normal speech, no focal findings or movement disorder noted   Psychiatric: alert, oriented to person, place, and time  Integument: normal coloration and turgor, no rashes, no suspicious skin lesions noted.    Shoulder pain with abduction above 90° no weakness on left.  Strong pulse and no sensory loss.  Positive Neer's test.      Data reviewed    Previous medical records reviewed and summarized above in HPI.     Laboratory    I have reviewed old labs below:    Lab Results   Component Value Date    WBC 5.40 12/08/2015    HGB 14.3 12/08/2015    HCT 41.7 12/08/2015    MCV 92 12/08/2015     12/08/2015     12/08/2015    K 4.2 12/08/2015     12/08/2015    CALCIUM 10.0 12/08/2015    CO2 22 (L) 12/08/2015     (H) 12/08/2015    BUN 11 12/08/2015    CREATININE 0.7 12/08/2015    ANIONGAP 8 12/08/2015    ESTGFRAFRICA >60 12/08/2015    EGFRNONAA >60 12/08/2015    PROT 7.1 12/08/2015    ALBUMIN 3.2 (L) 12/08/2015    BILITOT 0.6 12/08/2015    ALKPHOS 80  12/08/2015    ALT 28 12/08/2015    AST 21 12/08/2015    CHOL 171 12/08/2015    TRIG 117 12/08/2015    HDL 45 12/08/2015    LDLCALC 102.6 12/08/2015    TSH 1.324 12/08/2015     Lab reviewed by me: Particular labs of significance that I will monitor, workup, or treat to improve are marked below.     []HGB low []A1c  high []LDL high []Cr    high []Na  high []K  high []AST high []TSH  high   []Plt low  [x]Gluc high  []Trig high []GFR low []Na  low []K  low []ALT high []TSH  low       Imaging/Tracing: I have reviewed the pertinent results/findings and my personal findings are below:     US breast fibroglandular tissue no cancer    Assessment/Plan    Sara Ferro is a 54 y.o. female who presents to clinic with:    1. Encounter for preventive health examination    2. Impingement syndrome of left shoulder    3. Oral thrush    4. Colon cancer screening         Diagnostic impression remarks      Shoulder impingement syndrome-poorly control and will start sulindac along with physical therapy for restrain thinning exercises in educated on process and to avoid overhead activities and will reassess and if not improved will do steroid injection for symptomatic improvement.  Constant left shoulder pain present for the last 3 weeks as worse with overhead activity and abduction above 90° with no weakness and pain is sharp goes down her left arm when she moves her left and above 90°.  No chest pain or shortness of breath.  Movement makes this worse.     oral thrush-she noticed that she burned her tongue and some white fundi in her tongue and no other ulcers.  Will give oral nystatin get HIV   Fatty liver-stable and will get LFTs today and advise on diet exercise    preventive care-will get colonoscopy and mammogram up-to-date on others.  She does not want vaccinations    BMI Goal    Counseled patient on her ideal body weight, health consequences of being obese and current recommendations including weekly exercise and a  "heart healthy diet.  She is aware that ideal BMI < 25  Estimated body mass index is 31.13 kg/m² as calculated from the following:    Height as of this encounter: 5' 2" (1.575 m).    Weight as of this encounter: 77.2 kg (170 lb 3.1 oz).     She was counseled about the importance of healthy dietary habits as well as routine physical activity and exercise for better health outcomes. I also discussed the importance of cancer screening.     Medication Monitoring    In today's visit, monitoring for drug toxicity was accomplished. Proper use of medications was also discussed.     Counseling      I discussed imaging findings, diagnosis, possibilities, treatment options, medications, risks, and benefits. She had many questions regarding the options and long-term effects. All questions were answered. She expressed understanding after counseling regarding the diagnosis and recommendations. She was capable and demonstrated competence with understanding of these options. Shared decision making was performed resulting in her choosing the current treatment plan.     I also discussed the importance of close follow up to discuss labs, change or modify her medications if needed, monitor side effects, and further evaluation of medical problems.     Additional workup planned: see labs ordered below.    See below for labs and meds ordered with associated diagnosis    1. Encounter for preventive health examination  - CBC auto differential; Future  - Comprehensive Metabolic Panel; Future  - Lipid Panel; Future  - Hemoglobin A1C; Future  - Hepatitis C Antibody; Future  - Mammo Digital Screening Bilat; Future    2. Impingement syndrome of left shoulder  - Ambulatory referral/consult to Physical/Occupational Therapy; Future  - sulindac (CLINORIL) 200 MG Tab; Take 1 tablet (200 mg total) by mouth 2 (two) times daily.  Dispense: 60 tablet; Refill: 0    3. Oral thrush  - HIV 1/2 Ag/Ab (4th Gen); Future  - nystatin (MYCOSTATIN) 100,000 unit/mL " "suspension; Take 4 mLs (400,000 Units total) by mouth 4 (four) times daily. for 10 days  Dispense: 160 mL; Refill: 0    4. Colon cancer screening  - Ambulatory referral/consult to Gastroenterology; Future       Medication List with Changes/Refills   New Medications    NYSTATIN (MYCOSTATIN) 100,000 UNIT/ML SUSPENSION    Take 4 mLs (400,000 Units total) by mouth 4 (four) times daily. for 10 days    SULINDAC (CLINORIL) 200 MG TAB    Take 1 tablet (200 mg total) by mouth 2 (two) times daily.   Current Medications    (MAGIC MOUTHWASH) 1:1:1 BENADRYL 12.5MG/5ML LIQ, ALUMINUM & MAGNESIUM HYDROXIDE-SIMEHTICONE (MAALOX), LIDOCAINE VISCOUS 2%    Swish and spit 5 mLs every 4 (four) hours as needed. for mouth sores    BLACK COHOSH 20 MG TAB    Take 20 mg by mouth once daily.    CONJUGATED ESTROGENS (PREMARIN) VAGINAL CREAM    Place 0.5 g vaginally twice a week.    FLUTICASONE PROPIONATE (FLONASE) 50 MCG/ACTUATION NASAL SPRAY    1 spray (50 mcg total) by Each Nostril route once daily.    TRETINOIN (RETIN-A) 0.1 % CREAM    Apply topically every evening. Watch for skin irritation and dryness.    TRIAMCINOLONE ACETONIDE 0.1% (KENALOG) 0.1 % CREAM    Apply topically 2 (two) times daily.     Modified Medications    No medications on file       Follow up in about 6 months (around 4/14/2021). for further workup and reassessment if labs and tests obtained are stable or sooner as needed. She was instructed to call the clinic or go to the emergency department if her symptoms do not improve, worsens, or if new symptoms develop. Patient knows to call any time if an emergency arises. Shared decision making occurred and she verbalized understanding in agreement with this plan.     Documentation entered by me for this encounter may have been done in part using speech-recognition technology. Although I have made an effort to ensure accuracy, "sound like" errors may exist and should be interpreted in context.     Jose Sahu MD  10/14/2020 "

## 2020-10-15 ENCOUNTER — TELEPHONE (OUTPATIENT)
Dept: GASTROENTEROLOGY | Facility: CLINIC | Age: 55
End: 2020-10-15

## 2020-10-19 ENCOUNTER — CLINICAL SUPPORT (OUTPATIENT)
Dept: REHABILITATION | Facility: HOSPITAL | Age: 55
End: 2020-10-19
Payer: COMMERCIAL

## 2020-10-19 DIAGNOSIS — M75.42 IMPINGEMENT SYNDROME OF LEFT SHOULDER: ICD-10-CM

## 2020-10-19 DIAGNOSIS — R29.898 LEFT ARM WEAKNESS: ICD-10-CM

## 2020-10-19 DIAGNOSIS — M25.612 DECREASED RANGE OF MOTION OF LEFT SHOULDER: ICD-10-CM

## 2020-10-19 PROBLEM — M25.512 LEFT SHOULDER PAIN: Status: ACTIVE | Noted: 2020-10-19

## 2020-10-19 PROBLEM — M25.521 PAIN IN RIGHT ELBOW: Status: RESOLVED | Noted: 2019-01-18 | Resolved: 2020-10-19

## 2020-10-19 PROCEDURE — 97161 PT EVAL LOW COMPLEX 20 MIN: CPT | Mod: PN

## 2020-10-19 PROCEDURE — 97110 THERAPEUTIC EXERCISES: CPT | Mod: PN

## 2020-10-19 NOTE — PLAN OF CARE
"[facility]  Physical Therapy Initial Evaluation    Date: 10/19/2020   Name: Sara Ferro  Clinic Number: [id number]    Therapy Diagnosis:   Encounter Diagnoses   Name Primary?    Impingement syndrome of left shoulder     Decreased range of motion of left shoulder     Left arm weakness      Physician: Jose Sahu MD    Physician Orders: PT Eval and Treat   Medical Diagnosis from Referral: Impingement syndrome of the left shoulder  Evaluation Date: 10/19/2020  Authorization Period Expiration: 2020  Plan of Care Expiration: 20  Visit # / Visits authorized:     Time In: 1600  Time Out: 1640  Total Appointment Time (timed & untimed codes): 40 minutes    Precautions: Standard    Subjective   Date of onset: 10/951968  History of current condition - Randell reports: constant lateral L shoulder pain that started about 3 weeks ago. Mrs. Ferro denies a significant BRUNILDA, but states she had been doing a lot of painting and cleaning around that time.  She reports the pain can radiate to her L upper trap area, down her L shoulder blade, across her left chest, and down to her hand. She denies any numbness or tingling.  She states that it hurts the most when she "lifts it up and out to the side." She also states when she walks a lot it "feels like her arm is pulling out of socket".  The pain limits Mrs. Ferro sleep and ability to perform ADLs.      Medical History:   Past Medical History:   Diagnosis Date    Fatty liver        Surgical History:   Sara Ferro  has a past surgical history that includes Endometrial ablation;  section; Tubal ligation; Belt abdominoplasty; and Hysterectomy.    Medications:   Sara has a current medication list which includes the following prescription(s): diphenhydramine hcl, black cohosh, conjugated estrogens, fluticasone propionate, nystatin, sulindac, tretinoin, and triamcinolone acetonide 0.1%.    Allergies: "   Review of patient's allergies indicates:  No Known Allergies     Imaging, none:     Prior Therapy: none  Social History: lives with her spouse in a one story house, no stairs  Occupation: homemaker  Prior Level of Function: independent   Current Level of Function: moderate difficulty w/ ADLs    Pain:  Current 6/10, worst 8/10, best 3/10   Location: L UT area, lateral aspect of L shldr,   Description: Aching, Dull and Jabbing  Aggravating Factors: Walking and Lifting  Easing Factors: ice, heating pad and rest    Patients goals: decrease pain    Objective     Posture: guarded   Palpation: TOP to L lateral shoulder  Sensation: intact   DTRs: n/a  Edema:  Left: absent  Right: absent    Range of Motion/Strength:     C-Spine    Pain/Dysfunction with Movement    AROM PROM MMT    Flexion   WFL    NT   NT    Extension   WFL    NT   NT    R SB   WFL    NT   NT    L SB   75%    NT   NT    R Rotation   WFL    NT   NT    L Rotation   WFL    NT   NT        Shoulder  Right   Left  Pain/Dysfunction with Movement    AROM PROM MMT AROM PROM MMT    Flexion   WFL    NT   4+   114*    NT   2+ ROM measurements taken in supine   Extension   WFL    NT   NT   WFL    NT   NT    Abduction   WFL    NT   4+   79*    NT   2+ ROM measurements taken in supine   Adduction   WFL    NT   NT   WFL    NT   NT    Internal rotation   WFL    NT   NT   41*    NT   NT ROM measurements taken in supine   External rotation   WFL    NT   NT   54*    NT   NT ROM measurements taken in supine       Elbow  Right   Left  Pain/Dysfunction with Movement    AROM PROM MMT AROM PROM MMT    Flexion   WFL    NT   4+   WFL    NT   4-    Extension   WFL    NT   NT   WFL      NT   NT      Wrist/Hand  Right   Left  Pain/Dysfunction with Movement    AROM PROM MMT AROM PROM MMT    Flexion   WFL    NT   NT   WFL    NT   NT    Extension   WFL    NT   NT   WFL    NT   NT       WFL    NT   NT   WFL    NT    NT        Gait: Without AD  Analysis: WNL  Falls: none  Bed Mobility:  supervision  Transfers: supervision  Special Tests: UEFI - 46/80        Limitation/Restriction for FOTO n//a Survey    Therapist reviewed FOTO scores for Sara Ferro on 10/19/2020.       Limitation Score: n/a%         TREATMENT   Treatment Time In: 1630  Treatment Time Out: 1640  Total Treatment time (time-based codes) separate from Evaluation: 10 minutes    Randell received therapeutic exercises to develop ROM for 10 minutes including:     X 10 ea therapist assisted L shoulder AAROM in supine - flex, abd, IR, ER    Home Exercises and Patient Education Provided    Education provided:   - use of ice at home for pain   - continue pain meds as prescribed by MD    Written Home Exercises Provided: no.    Assessment   Sara is a 54 y.o. female referred to outpatient Physical Therapy with a medical diagnosis of impingement syndrome of the L shoulder. Patient presents with significant pain, weakness, and limited ROM of the L shoulder.  Due to these deficits, Mrs. Ferro has moderate difficult performing her everyday activities.     Patient prognosis is Fair.   Patient will benefit from skilled outpatient Physical Therapy to address the deficits stated above and in the chart below, provide patient /family education, and to maximize patientt's level of independence.     Plan of care discussed with patient: Yes  Patient's spiritual, cultural and educational needs considered and patient is agreeable to the plan of care and goals as stated below:     Anticipated Barriers for therapy: pain    Medical Necessity is demonstrated by the following  History  Co-morbidities and personal factors that may impact the plan of care Co-morbidities:   prior abdominal surgery    Personal Factors:   no deficits     low   Examination  Body Structures and Functions, activity limitations and participation restrictions that may impact the plan of care Body Regions:   upper extremities    Body Systems:    ROM  strength    Participation  Restrictions:   none    Activity limitations:   Learning and applying knowledge  no deficits    General Tasks and Commands  no deficits    Communication  no deficits    Mobility  lifting and carrying objects    Self care  caring for body parts (brushing teeth, shaving, grooming)  dressing    Domestic Life  shopping  cooking  doing house work (cleaning house, washing dishes, laundry)    Interactions/Relationships  no deficits    Life Areas  no deficits    Community and Social Life  no deficits         low   Clinical Presentation stable and uncomplicated low   Decision Making/ Complexity Score: low     Goals:    Short Term Goals (3 Weeks):   1. Decrease patient's c/o pain to 4/10 during performance of ADL's for independence of self care activities.  2. Pt will increase L shoulder AROM in supine 5-10 degrees to improve available ROM.  3. Pt will report an improved ability to open a jar from quite a bit of difficulty to moderate difficulty.    Long Term Goals (5 Weeks):   1. Patient to demo comp w/ HEP to maintain therapeutic gains.  2. Patient to improve L shoulder MMT 1/2 grade to demo strength gains from therapeutic intervention.  3. Patient will increase L shoulder AROM to improve ability to dress from quite a bit of difficulty to moderate difficulty.      Plan   Plan of care Certification: 10/19/2020 to 11/21/2020.    Outpatient Physical Therapy 2 times weekly for 5 weeks to include the following interventions: Electrical Stimulation for pain, Manual Therapy, Moist Heat/ Ice, Patient Education, Self Care, Therapeutic Activites, Therapeutic Exercise, Ultrasound, Dry Needling, and HEP    I certify that I was present in the room directing the student in service delivery and guiding them using my skilled judgment. As the co-signing therapist I have reviewed the students documentation and am responsible for the treatment, assessment, and plan.     - Yon Ragland, PT      Gabby Pierre, SPT

## 2020-10-19 NOTE — PROGRESS NOTES
Thank you for the referral.  Please see POC.    I certify that I was present in the room directing the student in service delivery and guiding them using my skilled judgment. As the co-signing therapist I have reviewed the students documentation and am responsible for the treatment, assessment, and plan.     - Yon Ragland, PT

## 2020-10-20 NOTE — PROGRESS NOTES
"  [facility]  Physical Therapy Initial Evaluation    Date: 10/19/2020   Name: Sara Ferro  Clinic Number: [id number]    Therapy Diagnosis:   Encounter Diagnoses   Name Primary?    Impingement syndrome of left shoulder     Decreased range of motion of left shoulder     Left arm weakness      Physician: Jose Sahu MD    Physician Orders: {AMB PT KNEE ORDERS:16140} ***  Medical Diagnosis from Referral: ***  Evaluation Date: 10/19/2020  Authorization Period Expiration: ***  Plan of Care Expiration: ***  Visit # / Visits authorized: ***/ ***    Time In: ***  Time Out: ***  Total Appointment Time (timed & untimed codes): *** minutes    Precautions: {IP WOUND PRECAUTIONS OHS:51849}    Subjective   Date of onset: ***  History of current condition - Randell reports: ***     Medical History:   Past Medical History:   Diagnosis Date    Fatty liver        Surgical History:   Sara Ferro  has a past surgical history that includes Endometrial ablation;  section; Tubal ligation; Belt abdominoplasty; and Hysterectomy.    Medications:   Sara has a current medication list which includes the following prescription(s): diphenhydramine hcl, black cohosh, conjugated estrogens, fluticasone propionate, nystatin, sulindac, tretinoin, and triamcinolone acetonide 0.1%.    Allergies:   Review of patient's allergies indicates:  No Known Allergies     Imaging, {Mri/ctscan/bone scan:17689}: ***    Prior Therapy: ***  Social History: *** {LIVES WITH:79230}  Occupation: ***  Prior Level of Function: ***  Current Level of Function: ***    Pain:  Current {0-10:50180::"0"}/10, worst {0-10:::"0"}/10, best {0-10:::"0"}/10   Location: {RIGHT LEFT BILATERAL:39227} {LOCATION ON BODY:91651} {Pain Loc:28620}  Description: {Pain Description:13135}  Aggravating Factors: {Causes; Pain:27016}  Easing Factors: {Pain (activities that relieve):31506}    Patients goals: " "***    Objective     ***      Limitation/Restriction for FOTO *** Survey    Therapist reviewed FOTO scores for Sara Ferro on 10/19/2020.   FOTO documents entered into [employer] - see Media section.    Limitation Score: ***%         TREATMENT   Treatment Time In: ***  Treatment Time Out: ***  Total Treatment time (time-based codes) separate from Evaluation: *** minutes    Randell received therapeutic exercises to develop {AMB PT PROGRESS OBJECTIVE:90849} for *** minutes including:  ***    Randell received the following manual therapy techniques: {AMB PT PROGRESS MANUAL THERAPY:93722} were applied to the: *** for *** minutes, including:  ***    Randell participated in neuromuscular re-education activities to improve: {AMB PT PROGRESS NEURO RE-ED:48984} for *** minutes. The following activities were included:  ***    Randell participated in dynamic functional therapeutic activities to improve functional performance for ***  minutes, including:  ***    Randell participated in gait training to improve functional mobility and safety for ***  minutes, including:  ***    Randell received the following direct contact modalities after being cleared for contraindications: {AMB PT PROGRESS DIRECT CONTACT MODES:77749}    Randell received the following supervised modalities after being cleared for contradictions: {AMB PT SUPERVISED MODES:34858}    Randell received hot pack for *** minutes to ***.    Randell received cold pack for *** minutes to ***.    Home Exercises and Patient Education Provided    Education provided:   - ***    Written Home Exercises Provided: {Blank single:17138::"yes","Patient instructed to cont prior HEP"}.  Exercises were reviewed and Randell was able to demonstrate them prior to the end of the session.  Randell demonstrated {Desc; good/fair/poor:31141} understanding of the education provided.     See EMR under {Blank single:50076::"Media","Patient Instructions"} for exercises provided {Blank single:94157::"10/19/2020","prior " "visit"}.    Assessment   Sara is a 54 y.o. female referred to outpatient Physical Therapy with a medical diagnosis of ***. Patient presents with ***    Patient prognosis is {REHAB PROGNOSIS OHS:05635}.   Patientt will benefit from skilled outpatient Physical Therapy to address the deficits stated above and in the chart below, provide patient /family education, and to maximize patientt's level of independence.     Plan of care discussed with patient: {YES:29595}  Patient's spiritual, cultural and educational needs considered and patient is agreeable to the plan of care and goals as stated below:     Anticipated Barriers for therapy: ***    Medical Necessity is demonstrated by the following  History  Co-morbidities and personal factors that may impact the plan of care Co-morbidities:   {Co-morbidities:20261}    Personal Factors:   {Personal Factors:29272}     {Desc; low/moderate/high:572206}   Examination  Body Structures and Functions, activity limitations and participation restrictions that may impact the plan of care Body Regions:   {Body Regions:85292}    Body Systems:    {Body Systems:67603}    Participation Restrictions:   ***    Activity limitations:   Learning and applying knowledge  {Learning and applying knowledge:04187}    General Tasks and Commands  {Gen tasks and commands:17266}    Communication  {Communication:83141}    Mobility  {Mobility:79047}    Self care  {Self Care:67907}    Domestic Life  {Domestic Life:37068}    Interactions/Relationships  {Interactions/Relationships:45990}    Life Areas  {Life Areas:97230}    Community and Social Life  {Community/Social Life:62771}         {Desc; low/moderate/high:512957}   Clinical Presentation {Clinical Presentation :68077} {Desc; low/moderate/high:627759}   Decision Making/ Complexity Score: {Desc; low/moderate/high:838805}     Goals:  Short Term Goals: *** weeks   ***    Long Term Goals: *** weeks   ***    Plan   Plan of care Certification: 10/19/2020 to " "***.    Outpatient Physical Therapy {NUMBERS 1-5:64221} times weekly for {0-10:86408::"0"} weeks to include the following interventions: {TX PLAN:11975}.     Gabby Pierre, SPT  "

## 2020-10-21 ENCOUNTER — HOSPITAL ENCOUNTER (OUTPATIENT)
Dept: RADIOLOGY | Facility: HOSPITAL | Age: 55
Discharge: HOME | End: 2020-10-21
Attending: STUDENT IN AN ORGANIZED HEALTH CARE EDUCATION/TRAINING PROGRAM
Payer: COMMERCIAL

## 2020-10-21 DIAGNOSIS — Z00.00 ENCOUNTER FOR PREVENTIVE HEALTH EXAMINATION: ICD-10-CM

## 2020-10-21 PROCEDURE — 77067 MAMMO DIGITAL SCREENING BILAT WITH TOMO: ICD-10-PCS | Mod: 26,,, | Performed by: RADIOLOGY

## 2020-10-21 PROCEDURE — 77067 SCR MAMMO BI INCL CAD: CPT | Mod: 26,,, | Performed by: RADIOLOGY

## 2020-10-21 PROCEDURE — 77063 MAMMO DIGITAL SCREENING BILAT WITH TOMO: ICD-10-PCS | Mod: 26,,, | Performed by: RADIOLOGY

## 2020-10-21 PROCEDURE — 77067 SCR MAMMO BI INCL CAD: CPT | Mod: TC

## 2020-10-21 PROCEDURE — 77063 BREAST TOMOSYNTHESIS BI: CPT | Mod: 26,,, | Performed by: RADIOLOGY

## 2020-10-22 ENCOUNTER — CLINICAL SUPPORT (OUTPATIENT)
Dept: REHABILITATION | Facility: HOSPITAL | Age: 55
End: 2020-10-22
Payer: COMMERCIAL

## 2020-10-22 DIAGNOSIS — R29.898 LEFT ARM WEAKNESS: ICD-10-CM

## 2020-10-22 DIAGNOSIS — M25.612 DECREASED RANGE OF MOTION OF LEFT SHOULDER: ICD-10-CM

## 2020-10-22 PROCEDURE — 97110 THERAPEUTIC EXERCISES: CPT | Mod: PN,CQ

## 2020-10-22 NOTE — PROGRESS NOTES
"  Physical Therapy Treatment Note     Name: Sara Ferro  Clinic Number: [id number]    Therapy Diagnosis:  Impingement syndrome of left shoulder       Decreased range of motion of left shoulder      Left arm weakness          Physician: Jose Sahu MD    Visit Date: 10/22/2020    Physician Orders: PT Eval and Treat   Medical Diagnosis from Referral: Impingement syndrome of the left shoulder  Evaluation Date: 10/19/2020  Authorization Period Expiration: 12/31/2020  Plan of Care Expiration: 11/21/20  Visit # / Visits authorized: 2/ 20    Time In: 1701  Time Out: 1745  Total Billable Time: 40 minutes    Precautions: Standard    Subjective     Pt reports: minimal L shoulder pain at present. It's going to hurt when we do therapy."  She was not compliant with home exercise program. (not yet issued)  Response to previous treatment: soreness  Functional change: n/a (first visit after eval)    Pain: 2/10  Location: left shoulder      Objective     Randell received therapeutic exercises to develop strength, endurance, ROM, flexibility, posture and core stabilization for 40 minutes including:    Mat:  PROM L shoulder all planes   AAROM all planes  Wand flexion L 10/2  Wand ER L 10/2  SL L shoulder ER 10/2  SL L shoulder scapular setting 10/2    Standing:  Isometrics L shoulder 10/2 with all on wall: flex, abd, IR, ER,  ext    Seated:  Retro shoulder rolls B 10/2  Scapular retraction B 10/2  Scapular depression B 10/2  Oh pulley flexion 3' AAROM  OH pulley scaption 3' AAROM    Home Exercises Provided and Patient Education Provided     Education provided:   - Educated pt that he/she may feel soreness after session.    - Instruction on CP for inflammation and safety.     Written Home Exercises Provided: yes.  Exercises were reviewed and Randell was able to demonstrate them prior to the end of the session.  Randell demonstrated good  understanding of the education provided.     See EMR under Patient Instructions for " exercises provided 10/22/2020.    Assessment     Apprehensive with PROM which improves with repetition. Notable strength and endurance deficits L shoulder. No increase in discomfort after session.     Randell is progressing well towards her goals.   Pt prognosis is Fair.     Pt will continue to benefit from skilled outpatient physical therapy to address the deficits listed in the problem list box on initial evaluation, provide pt/family education and to maximize pt's level of independence in the home and community environment.     Pt's spiritual, cultural and educational needs considered and pt agreeable to plan of care and goals.     Anticipated barriers to physical therapy: pain    Goals:     Short Term Goals (3 Weeks):  (ongoing)  1. Decrease patient's c/o pain to 4/10 during performance of ADL's for independence of self care activities.  2. Pt will increase L shoulder AROM in supine 5-10 degrees to improve available ROM.  3. Pt will report an improved ability to open a jar from quite a bit of difficulty to moderate difficulty.     Long Term Goals (5 Weeks):   1. Patient to demo comp w/ HEP to maintain therapeutic gains.  2. Patient to improve L shoulder MMT 1/2 grade to demo strength gains from therapeutic intervention.  3. Patient will increase L shoulder AROM to improve ability to dress from quite a bit of difficulty to moderate difficulty.  Plan     Continue per POC, progressing as appropriate.    Ania Faye, PTA

## 2020-10-28 ENCOUNTER — TELEPHONE (OUTPATIENT)
Dept: FAMILY MEDICINE | Facility: CLINIC | Age: 55
End: 2020-10-28

## 2020-10-28 NOTE — TELEPHONE ENCOUNTER
----- Message from Radha Yu sent at 10/28/2020 10:09 AM CDT -----  Contact: pt at [phone]  Type:  Test Results    Who Called: pt  Name of Test (Lab/Mammo/Etc):  labs  Date of Test:  10/21  Ordering Provider:  Luis Alberto  Where the test was performed:  [facility]  Best Call Back Number: [phone]

## 2020-10-29 ENCOUNTER — TELEPHONE (OUTPATIENT)
Dept: REHABILITATION | Facility: HOSPITAL | Age: 55
End: 2020-10-29

## 2020-11-04 ENCOUNTER — CLINICAL SUPPORT (OUTPATIENT)
Dept: REHABILITATION | Facility: HOSPITAL | Age: 55
End: 2020-11-04
Payer: COMMERCIAL

## 2020-11-04 DIAGNOSIS — M25.612 DECREASED RANGE OF MOTION OF LEFT SHOULDER: Primary | ICD-10-CM

## 2020-11-04 DIAGNOSIS — R29.898 LEFT ARM WEAKNESS: ICD-10-CM

## 2020-11-04 PROCEDURE — 97110 THERAPEUTIC EXERCISES: CPT | Mod: PN,CQ

## 2020-11-04 NOTE — PROGRESS NOTES
"  Physical Therapy Treatment Note     Name: Sara Ferro  Clinic Number: [id number]    Therapy Diagnosis:  Impingement syndrome of left shoulder       Decreased range of motion of left shoulder      Left arm weakness          Physician: Jose Sahu MD    Visit Date: 11/4/2020    Physician Orders: PT Eval and Treat   Medical Diagnosis from Referral: Impingement syndrome of the left shoulder  Evaluation Date: 10/19/2020  Authorization Period Expiration: 12/31/2020  Plan of Care Expiration: 11/21/20  Visit # / Visits authorized: 3/20    Time In: 1740    Time Out: 1841  Total Billable Time: 61 minutes    Precautions: Standard    Subjective     Pt reports: "I feel like it's getting better"  She was compliant with home exercise program.   Response to previous treatment: no complaints  Functional change: Still cannot wear bra    Pain: 5/10  Location: left shoulder      Objective     Randell received therapeutic exercises to develop strength, endurance, ROM, flexibility, posture and core stabilization for 61 minutes including:    Mat:   PROM L shoulder all planes    AAROM all planes   Wand flexion L 10/2   Wand ER L 10/2   SL L shoulder ER 10/2   SL L shoulder scapular setting 10/2    Standing:  Isometrics L shoulder 10/2 with all on wall: flex, abd, IR, ER, ext    Seated: Retro shoulder rolls B 10/2  Scapular retraction B 10/2  Scapular depression B 10/2  Oh pulley flexion 3' AAROM  OH pulley scaption 3' AAROM    Home Exercises Provided and Patient Education Provided     Education provided:   - Educated pt that he/she may feel soreness after session.    - Instruction on CP for inflammation and safety.     Written Home Exercises Provided: yes.  Exercises were reviewed and Randell was able to demonstrate them prior to the end of the session.  Randell demonstrated good  understanding of the education provided.     See EMR under Media for exercises provided 11/4/2020. (seated and standing flexion and abduction " stretches)    Assessment     Randell with increased discomfort as soon as AAROM in supine began.  Mid deltoid is where she was pointing to as where the pain was.  Pain eased up very slowly throughout therex session.  Patient tender to touch at anterior L shoulder and remains unable to wear a bra    Randell is progressing well towards her goals.   Pt prognosis is Fair.     Pt will continue to benefit from skilled outpatient physical therapy to address the deficits listed in the problem list box on initial evaluation, provide pt/family education and to maximize pt's level of independence in the home and community environment.     Pt's spiritual, cultural and educational needs considered and pt agreeable to plan of care and goals.     Anticipated barriers to physical therapy: pain    Goals:     Short Term Goals (3 Weeks):    1. Decrease patient's c/o pain to 4/10 during performance of ADL's for independence of self care activities.(progressing)  2. Pt will increase L shoulder AROM in supine 5-10 degrees to improve available ROM.(progressing)  3. Pt will report an improved ability to open a jar from quite a bit of difficulty to moderate difficulty.(progressing)     Long Term Goals (5 Weeks):   1. Patient to demo comp w/ HEP to maintain therapeutic gains.(progressing)  2. Patient to improve L shoulder MMT 1/2 grade to demo strength gains from therapeutic intervention.(progressing)  3. Patient will increase L shoulder AROM to improve ability to dress from quite a bit of difficulty to moderate difficulty.(progressing)  Plan     Continue with AROM and strengthening therex for L UE.  Areli Lacy, PTA

## 2020-11-05 ENCOUNTER — CLINICAL SUPPORT (OUTPATIENT)
Dept: REHABILITATION | Facility: HOSPITAL | Age: 55
End: 2020-11-05
Payer: COMMERCIAL

## 2020-11-05 DIAGNOSIS — M25.512 LEFT SHOULDER PAIN, UNSPECIFIED CHRONICITY: ICD-10-CM

## 2020-11-05 DIAGNOSIS — R29.898 LEFT ARM WEAKNESS: ICD-10-CM

## 2020-11-05 DIAGNOSIS — M25.612 DECREASED RANGE OF MOTION OF LEFT SHOULDER: ICD-10-CM

## 2020-11-05 PROCEDURE — 97110 THERAPEUTIC EXERCISES: CPT | Mod: PN

## 2020-11-06 NOTE — PROGRESS NOTES
Physical Therapy Treatment Note     Name: Sara [facility]  [facility] Number: [id number]    Therapy Diagnosis:   Encounter Diagnoses   Name Primary?    Decreased range of motion of left shoulder     Left arm weakness     Left shoulder pain, unspecified chronicity      Physician: Jose Sahu MD    Visit Date: 11/5/2020    Physician Orders: PT Eval and Treat   Medical Diagnosis from Referral: Impingement syndrome of the left shoulder  Evaluation Date: 10/19/2020  Authorization Period Expiration: 12/31/2020  Plan of Care Expiration: 11/21/20  Visit # / Visits authorized: 3/ 20   Time In: 1732  Time Out: 1812  Total Billable Time: 40 minutes    Precautions: Standard    Subjective     Pt reports: no new c/o..  She was compliant with home exercise program.  Response to previous treatment: sore.  Functional change: no    Pain: 3/10  Location: left shoulder      Objective     Randell received therapeutic exercises to develop strength, endurance, ROM and flexibility for 40 minutes including:  See flowsheet    Randell received the following manual therapy techniques: Joint mobilizations were applied to the: lt shoulder  for 6 minutes,   Education provided:   - ROM EX HEP 4 X DAY.    Assessment     All movements painful.  Randell is progressing well towards her goals.   Pt prognosis is Good.     Pt will continue to benefit from skilled outpatient physical therapy to address the deficits listed in the problem list box on initial evaluation, provide pt/family education and to maximize pt's level of independence in the home and community environment.     Pt's spiritual, cultural and educational needs considered and pt agreeable to plan of care and goals.     Anticipated barriers to physical therapy: no    Goals:   Short Term Goals (3 Weeks):   1. Decrease patient's c/o pain to 4/10 during performance of ADL's for independence of self care activities.  2. Pt will increase L shoulder AROM in supine 5-10 degrees to improve  available ROM.  3. Pt will report an improved ability to open a jar from quite a bit of difficulty to moderate difficulty.     Long Term Goals (5 Weeks):   1. Patient to demo comp w/ HEP to maintain therapeutic gains.  2. Patient to improve L shoulder MMT 1/2 grade to demo strength gains from therapeutic intervention.  3. Patient will increase L shoulder AROM to improve ability to dress from quite a bit of difficulty to moderate difficulty.     Plan     Progress as able.    Owen Nielsen, PT

## 2020-11-06 NOTE — PROGRESS NOTES
11/05/20 1800   Cx/Thoracic Exercises   Shoulder Shrugs Rep/Sets/Weight 30   Freemotion Scap. Retraction W/Ext Reps/Sets/Weights 30,rolls 30   Shoulder Exercises   Overhead Pulley 4'/4'   Codman's Pendulum Reps/Sets/Time 10   Additional Exercises   Additional Exercise UBE 4/4',MTT 6'   Additional Exercise WAND FL/EX/AB 30

## 2020-11-10 ENCOUNTER — CLINICAL SUPPORT (OUTPATIENT)
Dept: REHABILITATION | Facility: HOSPITAL | Age: 55
End: 2020-11-10
Payer: COMMERCIAL

## 2020-11-10 DIAGNOSIS — M25.612 DECREASED RANGE OF MOTION OF LEFT SHOULDER: ICD-10-CM

## 2020-11-10 DIAGNOSIS — R29.898 LEFT ARM WEAKNESS: ICD-10-CM

## 2020-11-10 DIAGNOSIS — M25.512 LEFT SHOULDER PAIN, UNSPECIFIED CHRONICITY: ICD-10-CM

## 2020-11-10 PROCEDURE — 97110 THERAPEUTIC EXERCISES: CPT | Mod: PN,CQ

## 2020-11-10 PROCEDURE — 97140 MANUAL THERAPY 1/> REGIONS: CPT | Mod: PN,CQ

## 2020-11-10 NOTE — PROGRESS NOTES
"  Physical Therapy Treatment Note     Name: Sara Ferro  Clinic Number: [id number]    Therapy Diagnosis:   Encounter Diagnoses   Name Primary?    Decreased range of motion of left shoulder     Left arm weakness     Left shoulder pain, unspecified chronicity      Physician: Jose Sahu MD    Visit Date: 11/10/2020    Physician Orders: PT Eval and Treat   Medical Diagnosis from Referral: Impingement syndrome of the left shoulder  Evaluation Date: 10/19/2020  Authorization Period Expiration: 12/31/2020  Plan of Care Expiration: 11/21/20  Visit # / Visits authorized: 4/ 20     Time In: 1536  Time Out: 1620  Total Billable Time: 44 minutes    Precautions: Standard    Subjective     Pt reports: "not really pain" but gets "annoying" discomfort in middle L deltoid area.   She was compliant with home exercise program.  Response to previous treatment: sore.  Functional change: no    Pain: 3/10  Location: left shoulder      Objective     Randell received therapeutic exercises to develop strength, endurance, ROM and flexibility for 34 minutes including:    Mat:   AAROM all planes   Wand flexion L 10/2   Wand ER L 10/2   SL L shoulder ER 10/2   SL L shoulder scapular setting 10/2     Seated: Retro shoulder rolls B 10/2  Scapular retraction B 10/2  Oh pulley flexion 2 AAROM  OH pulley scaption 2' AAROM  Finger ladder standing to level 22 10 x 10s L shoulder flexion    Randell received the following manual therapy techniques: PROM all planes, scapular PROM and scouring, STM were applied to the: lt shoulder  for 10 minutes     Home Exercises Provided and Patient Education Provided      Education provided:   - Educated pt that he/she may feel soreness after session.    - Instruction on CP for inflammation and safety.      Written Home Exercises Provided: instructed pt to continue current HEP.  Exercises were reviewed and Randell was able to demonstrate them prior to the end of the session.  Randell demonstrated good  " understanding of the education provided.      See EMR under Patient Instructions for exercises provided 10/22/2020    Assessment     Initial discomfort at end ranges of PROM improve with repeated repetitions and ROM increases. Able to progress to finger ladder today without increase in discomfort.     Randell is progressing well towards her goals.   Pt prognosis is Good.     Pt will continue to benefit from skilled outpatient physical therapy to address the deficits listed in the problem list box on initial evaluation, provide pt/family education and to maximize pt's level of independence in the home and community environment.     Pt's spiritual, cultural and educational needs considered and pt agreeable to plan of care and goals.     Anticipated barriers to physical therapy: no    Goals:   Short Term Goals (3 Weeks): (ongoing)  1. Decrease patient's c/o pain to 4/10 during performance of ADL's for independence of self care activities.  2. Pt will increase L shoulder AROM in supine 5-10 degrees to improve available ROM.  3. Pt will report an improved ability to open a jar from quite a bit of difficulty to moderate difficulty.     Long Term Goals (5 Weeks): (ongoing)  1. Patient to demo comp w/ HEP to maintain therapeutic gains.  2. Patient to improve L shoulder MMT 1/2 grade to demo strength gains from therapeutic intervention.  3. Patient will increase L shoulder AROM to improve ability to dress from quite a bit of difficulty to moderate difficulty.     Plan     Progress as able.    Ania Faye, PTA

## 2020-11-18 ENCOUNTER — CLINICAL SUPPORT (OUTPATIENT)
Dept: REHABILITATION | Facility: HOSPITAL | Age: 55
End: 2020-11-18
Payer: COMMERCIAL

## 2020-11-18 DIAGNOSIS — M25.612 DECREASED RANGE OF MOTION OF LEFT SHOULDER: ICD-10-CM

## 2020-11-18 DIAGNOSIS — R29.898 LEFT ARM WEAKNESS: ICD-10-CM

## 2020-11-18 DIAGNOSIS — M75.42 IMPINGEMENT SYNDROME OF LEFT SHOULDER: Primary | ICD-10-CM

## 2020-11-18 PROCEDURE — 97110 THERAPEUTIC EXERCISES: CPT | Mod: PN

## 2020-11-19 NOTE — PROGRESS NOTES
PT/PTA met face to face to discuss pt's treatment plan and progress towards established goals. Pt will be seen by a physical therapist minimally every 6th visit or every 30 days.      Yon Ragland, PT

## 2020-11-19 NOTE — PROGRESS NOTES
Patient would benefit from further PT visits to continue addressing ROM/strength deficits.  Pls see POC on eval.

## 2020-11-19 NOTE — PLAN OF CARE
Physical Therapy Updated Plan of Care     Name: Sara Ferro  [facility] Number: [id number]    Therapy Diagnosis:   Encounter Diagnoses   Name Primary?    Impingement syndrome of left shoulder Yes    Decreased range of motion of left shoulder     Left arm weakness      Physician: Jose Sahu MD    Visit Date: 11/18/2020    Physician Orders: PT Eval and Treat   Medical Diagnosis from Referral: Impingement syndrome of the left shoulder  Evaluation Date: 10/19/2020  Current Certification Period:  10/19/2020 to 11/21/2020  Authorization Period Expiration: 12/31/2020  Plan of Care Expiration: 11/21/20  Visit # / Visits authorized: 6/ 20    Time In: 1735  Time Out: 1805  Total Billable Time: 30 minutes    Precautions: none  Functional Level Prior to Evaluation:  independent      Subjective     Pt reports: moderate pain levels in lateral aspect of lt shoulder.  She was compliant with home exercise program.  Response to previous treatment: mild increase in symptoms.  Functional change: none    Pain: 4/10  Location: left shoulder        Objective     Range of Motion/Strength:      Shoulder   Right     Left   Pain/Dysfunction with Movement     AROM PROM MMT AROM PROM MMT     Flexion   WFL    NT   4+   116*    NT   2+ ROM measurements taken in supine   Extension   WFL    NT   NT   WFL    NT   NT     Abduction   WFL    NT   4+   85*    NT   2+ ROM measurements taken in supine   Adduction   WFL    NT   NT   WFL    NT   NT     Internal rotation   WFL    NT   NT   52*    NT   NT ROM measurements taken in supine   External rotation   WFL    NT   NT   35*    NT   NT ROM measurements taken in supine         Elbow   Right     Left   Pain/Dysfunction with Movement     AROM PROM MMT AROM PROM MMT     Flexion   WFL    NT   4+   WFL    NT   4-     Extension   WFL    NT   NT   WFL      NT   NT          Wrist/Hand   Right     Left   Pain/Dysfunction with Movement     AROM PROM MMT AROM PROM MMT     Flexion   WFL    NT    NT   WFL    NT   NT     Extension   WFL    NT   NT   WFL    NT   NT        WFL    NT    4+   WFL    NT     4        Gait: Without AD  Analysis: WNL    Bed Mobility: supervision  Transfers: supervision    Special Tests: UEFI - 35/68 (3 lines items not completed)    Treatment:    Randell received therapeutic exercises to develop strength and flexibility for 25 minutes including:     X 3'/3' OHP (flex/scap)   X 15 ea therapist assisted L shoulder AAROM in supine - flex, abd, IR, ER      Home Exercises Provided and Patient Education Provided     Education provided:   - cont. HEP    Written Home Exercises Provided: Patient instructed to cont prior HEP.      Assessment     Patient was able to demo mild improvement in her AROM, but still c/o persistent moderate pain levels in her lt shoulder.    Randell is progressing fairly well towards her goals.   Pt prognosis is Fair.     Pt will continue to benefit from skilled outpatient physical therapy to address the deficits listed in the problem list box on initial evaluation, provide pt/family education and to maximize pt's level of independence in the home and community environment.     Pt's spiritual, cultural and educational needs considered and pt agreeable to plan of care and goals.     Anticipated barriers to physical therapy: pain    Goals:     Previous Short Term Goals (3 Weeks):   1. Decrease patient's c/o pain to 4/10 during performance of ADL's for independence of self care activities. (MET)  2. Pt will increase L shoulder AROM in supine 5-10 degrees to improve available ROM. (PART MET)  3. Pt will report an improved ability to open a jar from quite a bit of difficulty to moderate difficulty. (NOT MET)     Previous Long Term Goals (5 Weeks):   1. Patient to demo comp w/ HEP to maintain therapeutic gains. (NOT MET)  2. Patient to improve L shoulder MMT 1/2 grade to demo strength gains from therapeutic intervention. (NOT MET)  3.  Patient will increase L shoulder AROM to  improve ability to dress from quite a bit of difficulty to moderate difficulty. (NOT MET)    New Short Term Goals (2 Weeks):   1.  Decrease patient's c/o pain to 3/10 during performance of ADL's for independence of self care activities.   2.  Pt will increase L shoulder AROM in supine 1-5 degrees to improve available ROM.   3.  Pt will report an improved ability to open a jar from quite a bit of difficulty to moderate difficulty.      New Long Term Goals (4 Weeks):   1.  Patient to demo comp w/ HEP to maintain therapeutic gains.   2.  Patient to improve L shoulder MMT 1/2 grade to demo strength gains from therapeutic intervention.   3.   Pt will report an improved ability to perform her usual hobbies from moderate difficulty to a little bit of difficulty.     Reasons for Recertification of Therapy:   Patient would benefit from further PT visits to cont. addressing ROM/strength deficits.      Plan     Updated Certification Period: 11/18/2020 to 12/19/2020  Recommended Treatment Plan: 2 times per week for 4 weeks (starting wk of 11/23/2020): Electrical Stimulation for pain, Manual Therapy, Moist Heat/ Ice, Patient Education, Self Care, Therapeutic Activites, Therapeutic Exercise, Ultrasound and HEP  Other Recommendations: Dry Needling    Yon Ragland, PT  11/18/2020      I CERTIFY THE NEED FOR THESE SERVICES FURNISHED UNDER THIS PLAN OF TREATMENT AND WHILE UNDER MY CARE    Physician's comments:        Physician's Signature: ___________________________________________________

## 2020-11-23 ENCOUNTER — TELEPHONE (OUTPATIENT)
Dept: FAMILY MEDICINE | Facility: CLINIC | Age: 55
End: 2020-11-23

## 2020-11-23 DIAGNOSIS — M25.512 CHRONIC LEFT SHOULDER PAIN: ICD-10-CM

## 2020-11-23 DIAGNOSIS — G89.29 CHRONIC LEFT SHOULDER PAIN: ICD-10-CM

## 2020-11-23 NOTE — TELEPHONE ENCOUNTER
----- Message from Gaby Alexis sent at 11/23/2020  1:39 PM CST -----  Type:  Patient Returning Call    Who Called:  Patient   Who Left Message for Patient:  Tyra   Does the patient know what this is regarding?:  yes   Best Call Back Number:     Additional Information:  please advise-thank you

## 2020-11-23 NOTE — TELEPHONE ENCOUNTER
----- Message from Marbella Nation sent at 11/23/2020  9:43 AM CST -----  Contact: self  Patient has been going to PT but during the initial visit the PT person told her she really needs to have an MRI done of her shoulder.  Please call back to advise [phone] (home).  thanks

## 2020-11-25 ENCOUNTER — TELEPHONE (OUTPATIENT)
Dept: FAMILY MEDICINE | Facility: CLINIC | Age: 55
End: 2020-11-25

## 2020-11-25 NOTE — TELEPHONE ENCOUNTER
----- Message from Kirsten Walls sent at 11/25/2020 11:55 AM CST -----  Regarding: MRI Order  Contact: Carine SHAH  Type: Needs Medical Advice  Who Called:  Carine with [facility]  Best Call Back Number: [phone] Ref [id number]  Additional Information: Carine SHAH called and stated the patient wants to have her MRI done at a different facility and would like it faxed to [facility] in [place] at [phone]

## 2020-11-25 NOTE — TELEPHONE ENCOUNTER
[employer] notified the fax was sent to [facility] understanding verbalized.     Pt notified of fax sent to [facility] understanding verbalized.

## 2020-12-05 ENCOUNTER — PATIENT MESSAGE (OUTPATIENT)
Dept: FAMILY MEDICINE | Facility: CLINIC | Age: 55
End: 2020-12-05

## 2020-12-12 ENCOUNTER — PATIENT MESSAGE (OUTPATIENT)
Dept: FAMILY MEDICINE | Facility: CLINIC | Age: 55
End: 2020-12-12

## 2020-12-15 ENCOUNTER — TELEPHONE (OUTPATIENT)
Dept: FAMILY MEDICINE | Facility: CLINIC | Age: 55
End: 2020-12-15

## 2020-12-15 DIAGNOSIS — M25.512 CHRONIC LEFT SHOULDER PAIN: Primary | ICD-10-CM

## 2020-12-15 DIAGNOSIS — G89.29 CHRONIC LEFT SHOULDER PAIN: Primary | ICD-10-CM

## 2020-12-15 NOTE — TELEPHONE ENCOUNTER
----- Message from Rakel Vilchis sent at 12/15/2020  8:17 AM CST -----  Regarding: MRI  Contact: patient  Patient want to know if office received her results for MRI from [facility] please call back at [phone] (home) [phone] (work)    Case number [id number]

## 2020-12-15 NOTE — TELEPHONE ENCOUNTER
Pt notified we had not received the records yet, pt wondering if referral to ortho could help, please advise.      Called [facility] and LM to try to obtain records from MRI.

## 2020-12-16 ENCOUNTER — TELEPHONE (OUTPATIENT)
Dept: FAMILY MEDICINE | Facility: CLINIC | Age: 55
End: 2020-12-16

## 2020-12-16 NOTE — TELEPHONE ENCOUNTER
----- Message from Judith Romero sent at 12/16/2020 10:38 AM CST -----  Regarding: MRI Results  Type: Needs Medical Advice  Who Called:  Pt    Best Call Back Number: [phone]    Additional Information:   Patient is requesting a call back in regards to  MRI results and recommendation on seeing another provider. Please and Thank you!

## 2020-12-17 DIAGNOSIS — M25.512 LEFT SHOULDER PAIN, UNSPECIFIED CHRONICITY: Primary | ICD-10-CM

## 2020-12-20 ENCOUNTER — PATIENT OUTREACH (OUTPATIENT)
Dept: ADMINISTRATIVE | Facility: OTHER | Age: 55
End: 2020-12-20

## 2020-12-21 ENCOUNTER — OFFICE VISIT (OUTPATIENT)
Dept: ORTHOPEDICS | Facility: CLINIC | Age: 55
End: 2020-12-21
Payer: COMMERCIAL

## 2020-12-21 ENCOUNTER — HOSPITAL ENCOUNTER (OUTPATIENT)
Dept: RADIOLOGY | Facility: HOSPITAL | Age: 55
Discharge: HOME | End: 2020-12-21
Attending: ORTHOPAEDIC SURGERY
Payer: COMMERCIAL

## 2020-12-21 VITALS — HEIGHT: 62 IN | WEIGHT: 170 LBS | BODY MASS INDEX: 31.28 KG/M2 | RESPIRATION RATE: 16 BRPM

## 2020-12-21 DIAGNOSIS — M25.512 CHRONIC LEFT SHOULDER PAIN: ICD-10-CM

## 2020-12-21 DIAGNOSIS — M75.02 ADHESIVE CAPSULITIS OF LEFT SHOULDER: Primary | ICD-10-CM

## 2020-12-21 DIAGNOSIS — G89.29 CHRONIC LEFT SHOULDER PAIN: ICD-10-CM

## 2020-12-21 DIAGNOSIS — M25.512 LEFT SHOULDER PAIN, UNSPECIFIED CHRONICITY: ICD-10-CM

## 2020-12-21 PROCEDURE — 99243 OFF/OP CNSLTJ NEW/EST LOW 30: CPT | Mod: 25,S$GLB,, | Performed by: ORTHOPAEDIC SURGERY

## 2020-12-21 PROCEDURE — 1125F PR PAIN SEVERITY QUANTIFIED, PAIN PRESENT: ICD-10-PCS | Mod: S$GLB,,, | Performed by: ORTHOPAEDIC SURGERY

## 2020-12-21 PROCEDURE — 99999 PR PBB SHADOW E&M-EST. PATIENT-LVL III: CPT | Mod: PBBFAC,,, | Performed by: ORTHOPAEDIC SURGERY

## 2020-12-21 PROCEDURE — 20610 PR DRAIN/INJECT LARGE JOINT/BURSA: ICD-10-PCS | Mod: LT,S$GLB,, | Performed by: ORTHOPAEDIC SURGERY

## 2020-12-21 PROCEDURE — 3008F PR BODY MASS INDEX (BMI) DOCUMENTED: ICD-10-PCS | Mod: CPTII,S$GLB,, | Performed by: ORTHOPAEDIC SURGERY

## 2020-12-21 PROCEDURE — 73030 XR SHOULDER TRAUMA 3 VIEW LEFT: ICD-10-PCS | Mod: 26,LT,, | Performed by: RADIOLOGY

## 2020-12-21 PROCEDURE — 3008F BODY MASS INDEX DOCD: CPT | Mod: CPTII,S$GLB,, | Performed by: ORTHOPAEDIC SURGERY

## 2020-12-21 PROCEDURE — 99999 PR PBB SHADOW E&M-EST. PATIENT-LVL III: ICD-10-PCS | Mod: PBBFAC,,, | Performed by: ORTHOPAEDIC SURGERY

## 2020-12-21 PROCEDURE — 73030 X-RAY EXAM OF SHOULDER: CPT | Mod: 26,LT,, | Performed by: RADIOLOGY

## 2020-12-21 PROCEDURE — 73030 X-RAY EXAM OF SHOULDER: CPT | Mod: TC,PN,LT

## 2020-12-21 PROCEDURE — 99243 PR OFFICE CONSULTATION,LEVEL III: ICD-10-PCS | Mod: 25,S$GLB,, | Performed by: ORTHOPAEDIC SURGERY

## 2020-12-21 PROCEDURE — 1125F AMNT PAIN NOTED PAIN PRSNT: CPT | Mod: S$GLB,,, | Performed by: ORTHOPAEDIC SURGERY

## 2020-12-21 PROCEDURE — 20610 DRAIN/INJ JOINT/BURSA W/O US: CPT | Mod: LT,S$GLB,, | Performed by: ORTHOPAEDIC SURGERY

## 2020-12-21 RX ORDER — TRIAMCINOLONE ACETONIDE 40 MG/ML
40 INJECTION, SUSPENSION INTRA-ARTICULAR; INTRAMUSCULAR
Status: DISCONTINUED | OUTPATIENT
Start: 2020-12-21 | End: 2020-12-21 | Stop reason: HOSPADM

## 2020-12-21 RX ADMIN — TRIAMCINOLONE ACETONIDE 40 MG: 40 INJECTION, SUSPENSION INTRA-ARTICULAR; INTRAMUSCULAR at 09:12

## 2020-12-21 NOTE — PROGRESS NOTES
Past Medical History:   Diagnosis Date    Fatty liver        Past Surgical History:   Procedure Laterality Date    BELT ABDOMINOPLASTY       SECTION      x2    ENDOMETRIAL ABLATION      HYSTERECTOMY      TUBAL LIGATION         Current Outpatient Medications   Medication Sig    (Magic mouthwash) 1:1:1 Benadryl 12.5mg/5ml liq, aluminum & magnesium hydroxide-simehticone (Maalox), lidocaine viscous 2% Swish and spit 5 mLs every 4 (four) hours as needed. for mouth sores    black cohosh 20 mg Tab Take 20 mg by mouth once daily.    fluticasone propionate (FLONASE) 50 mcg/actuation nasal spray 1 spray (50 mcg total) by Each Nostril route once daily.    tretinoin (RETIN-A) 0.1 % cream Apply topically every evening. Watch for skin irritation and dryness.    triamcinolone acetonide 0.1% (KENALOG) 0.1 % cream Apply topically 2 (two) times daily.    conjugated estrogens (PREMARIN) vaginal cream Place 0.5 g vaginally twice a week. (Patient not taking: Reported on 2020)     No current facility-administered medications for this visit.        Review of patient's allergies indicates:  No Known Allergies    Family History   Problem Relation Age of Onset    Breast cancer Cousin     Cancer Father         prostate    Diabetes Mother     Heart disease Mother     Breast cancer Maternal Aunt     Ovarian cancer Neg Hx     Melanoma Neg Hx        Social History     Socioeconomic History    Marital status:      Spouse name: Not on file    Number of children: Not on file    Years of education: Not on file    Highest education level: Not on file   Occupational History    Not on file   Social Needs    Financial resource strain: Not on file    Food insecurity     Worry: Not on file     Inability: Not on file    Transportation needs     Medical: Not on file     Non-medical: Not on file   Tobacco Use    Smoking status: Never Smoker    Smokeless tobacco: Never Used   Substance and Sexual Activity     Alcohol use: Yes     Alcohol/week: 0.0 standard drinks     Comment: socially    Drug use: No    Sexual activity: Yes     Partners: Male     Birth control/protection: Surgical     Comment: ablation / tubal   Lifestyle    Physical activity     Days per week: Not on file     Minutes per session: Not on file    Stress: Only a little   Relationships    Social connections     Talks on phone: Not on file     Gets together: Not on file     Attends Gnosticism service: Not on file     Active member of club or organization: Not on file     Attends meetings of clubs or organizations: Not on file     Relationship status: Not on file   Other Topics Concern    Are you pregnant or think you may be? Not Asked    Breast-feeding Not Asked   Social History Narrative    Not on file       Chief Complaint:   Chief Complaint   Patient presents with    Shoulder Pain     left shoulder pain       History of present illness:  This is a 55-year-old female seen in consultation for Dr. Sahu.  Patient is seen for left shoulder pain.  It has been hurting her for about 6 weeks now.  She is right-hand-dominant.  She tried physical therapy with mild improvement.  She had an MRI done couple weeks ago which showed no significant tearing.  Pain is 7/10.  Pain and decreased range of motion are noted.      Review of Systems:    Constitution: Negative for chills, fever, and sweats.  Negative for unexplained weight loss.    HENT:  Negative for headaches and blurry vision.    Cardiovascular:Negative for chest pain or irregular heart beat. Negative for hypertension.    Respiratory:  Negative for cough and shortness of breath.    Gastrointestinal: Negative for abdominal pain, heartburn, melena, nausea, and vomitting.    Genitourinary:  Negative bladder incontinence and dysuria.    Musculoskeletal:  See HPI    Neurological: Negative for numbness.    Psychiatric/Behavioral: Negative for depression.  The patient is not nervous/anxious.      Endocrine:  Negative for polyuria    Hematologic/Lymphatic: Negative for bleeding problem.  Does not bruise/bleed easily.    Skin: Negative for poor would healing and rash      Physical Examination:    Vital Signs:    Vitals:    12/21/20 0931   Resp: 16       Body mass index is 31.09 kg/m².    This a well-developed, well nourished patient in no acute distress.  They are alert and oriented and cooperative to examination.  Pt. walks without an antalgic gait.      Examination of the left shoulder shows no rashes or erythema. There are no masses, ecchymosis, or atrophy. The patient has decreased active range of motion in forward flexion, external rotation, and internal rotation to back pocket. The patient has moderately positive impingement signs. - Alcorn's test. - Speeds test. Nontender to palpation over a.c. joint. Normal stability anteriorly, posteriorly, and negative sulcus sign. Passive range of motion: Forward flexion of 140°, external rotation at 90° of 60°, internal rotation of 0°, and external rotation at 0° of 20°. 2+ radial pulse. Intact axillary, radial, median and ulnar sensation. 5 out of 5 resisted forward flexion, external rotation, and negative lift off test.    Examination of the right shoulder shows no rashes or erythema. There are no masses, ecchymosis, or atrophy. The patient has full range of motion in forward flexion, external rotation, and internal rotation to the mid T-spine. The patient has - impingement signs. - Alcorn's test. - Speeds test. Nontender to palpation over a.c. joint. Normal stability anteriorly, posteriorly, and negative sulcus sign. Passive range of motion: Forward flexion of 180°, external rotation at 90° of 90°, internal rotation of 50°, and external rotation at 0° of 50°. 2+ radial pulse. Intact axillary, radial, median and ulnar sensation. 5 out of 5 resisted forward flexion, external rotation, and negative lift off test.        X-rays:  Four views left shoulder ordered and reviewed  which show no atypical findings.    MRI of the left shoulder is reviewed from [facility]:  AC arthritis.  Mild glenohumeral arthritis.  Superior and anterior glenoid labral tear.  Rotator cuff tendinosis    Assessment::  Left adhesive capsulitis    Plan:  I reviewed the MRI in all the findings with her today.  We talked about adhesive capsulitis.  I recommended a shoulder injection and getting her back into physical therapy for capsular stretching.  I will see her back in 6 weeks.    This note was created using [employer] voice recognition software that occasionally misinterpreted phrases or words.    Consult note is delivered via Epic messaging service.

## 2020-12-21 NOTE — PATIENT INSTRUCTIONS
"Frozen Shoulder     Frozen shoulder, also called adhesive capsulitis, causes pain and stiffness in the shoulder. Over time, the shoulder becomes very hard to move.  Frozen shoulder occurs in about 2% of the general population. It most commonly affects people between the ages of 40 and 60, and occurs in women more often than men.  Anatomy   Your shoulder is a ball-and-socket joint made up of three bones: your upper arm bone (humerus), your shoulder blade (scapula), and your collarbone (clavicle).  The head of the upper arm bone fits into a shallow socket in your shoulder blade. Strong connective tissue, called the shoulder capsule, surrounds the joint.  To help your shoulder move more easily, synovial fluid lubricates the shoulder capsule and the joint.    The shoulder capsule surrounds the shoulder joint and rotator cuff tendons.       Description   In frozen shoulder, the shoulder capsule thickens and becomes tight. Stiff bands of tissue -- called adhesions -- develop. In many cases, there is less synovial fluid in the joint.  The hallmark sign of this condition is being unable to move your shoulder - either on your own or with the help of someone else. It develops in three stages:  Freezing  In the"freezing" stage, you slowly have more and more pain. As the pain worsens, your shoulder loses range of motion. Freezing typically lasts from 6 weeks to 9 months.  Frozen  Painful symptoms may actually improve during this stage, but the stiffness remains. During the 4 to 6 months of the "frozen" stage, daily activities may be very difficult.  Thawing  Shoulder motion slowly improves during the "thawing" stage. Complete return to normal or close to normal strength and motion typically takes from 6 months to 2 years.    In frozen shoulder, the smooth tissues of the shoulder capsule become thick, stiff, and inflamed.     Cause   The causes of frozen shoulder are not fully understood. There is no clear connection to arm " "dominance or occupation. A few factors may put you more at risk for developing frozen shoulder.  Diabetes. Frozen shoulder occurs much more often in people with diabetes, affecting 10% to 20% of these individuals. The reason for this is not known.  Other diseases. Some additional medical problems associated with frozen shoulder include hypothyroidism, hyperthyroidism, Parkinson's disease, and cardiac disease.  Immobilization. Frozen shoulder can develop after a shoulder has been immobilized for a period of time due to surgery, a fracture, or other injury. Having patients move their shoulders soon after injury or surgery is one measure prescribed to prevent frozen shoulder.    Symptoms   Pain from frozen shoulder is usually dull or aching. It is typically worse early in the course of the disease and when you move your arm. The pain is usually located over the outer shoulder area and sometimes the upper arm.     Doctor Examination   Physical Examination    Your doctor will test the range of motion in your shoulder.   Reproduced with permission from KEL Kapoor, ed: Essentials of Musculoskeletal Care, ed 4. [place], [place], [employer], 2010.   After discussing your symptoms and medical history, your doctor will examine your shoulder. Your doctor will move your shoulder carefully in all directions to see if movement is limited and if pain occurs with the motion. The range of motion when someone else moves your shoulder is called "passive range of motion." Your doctor will compare this to the range of motion you display when you move your shoulder on your own ("active range of motion"). People with frozen shoulder have limited range of motion both actively and passively.  Imaging Tests  Other tests that may help your doctor rule out other causes of stiffness and pain include:  X-rays. Dense structures, such as bone, show up clearly on x-rays. X-rays may show other problems in your shoulder, such " as arthritis.  Magnetic resonance imaging (MRI) and ultrasound. These studies can create better images of problems with soft tissues, such as a torn rotator cuff.    Treatment   Frozen shoulder generally gets better over time, although it may take up to 3 years.  The focus of treatment is to control pain and restore motion and strength through physical therapy.  Nonsurgical Treatment  More than 90% of patients improve with relatively simple treatments to control pain and restore motion.  Non-steroidal anti-inflammatory medicines. Drugs like aspirin and ibuprofen reduce pain and swelling.  Steroid injections. Cortisone is a powerful anti-inflammatory medicine that is injected directly into your shoulder joint.  Physical therapy. Specific exercises will help restore motion. These may be under the supervision of a physical therapist or via a home program. Therapy includes stretching or range of motion exercises for the shoulder. Sometimes heat is used to help loosen the shoulder up before the stretching exercises.. Below are examples of some of the exercises that might be recommended.  External rotation -- passive stretch.  a doorway and bend your affected arm 90 degrees to reach the doorjamb. Keep your hand in place and rotate your body as shown in the illustration. Hold for 30 seconds. Relax and repeat.     External Rotation - Passive Stretch     Forward flexion -- supine position. Lie on your back with your legs straight. Use your unaffected arm to lift your affected arm overhead until you feel a gentle stretch. Hold for 15 seconds and slowly lower to start position. Relax and repeat.     Forward Flexion - Supine Position     Crossover arm stretch. Gently pull one arm across your chest just below your chin as far as possible without causing pain. Hold for 30 seconds. Relax and repeat.     Crossover Arm Stretch     Surgical Treatment  If your symptoms are not relieved by therapy and anti-inflammatory  medicines, you and your doctor may discuss surgery. It is important to talk with your doctor about your potential for recovery continuing with simple treatments, and the risks involved with surgery.  The goal of surgery for frozen shoulder is to stretch and release the stiffened joint capsule. The most common methods include manipulation under anesthesia and shoulder arthroscopy.  Manipulation under anesthesia. During this procedure, you are put to sleep. Your doctor will force your shoulder to move which causes the capsule and scar tissue to stretch or tear. This releases the tightening and increases range of motion.  Shoulder arthroscopy. In this procedure, your doctor will cut through tight portions of the joint capsule. This is done using pencil-sized instruments inserted through small incisions around your shoulder.  In many cases, manipulation and arthroscopy are used in combination to obtain maximum results. Most patients have very good outcomes with these procedures.    These photos taken through an arthroscope show a normal shoulder joint lining (left) and an inflamed joint lining damaged by frozen shoulder.   Recovery. After surgery, physical therapy is necessary to maintain the motion that was achieved with surgery. Recovery times vary, from 6 weeks to three months. Although it is a slow process, your commitment to therapy is the most important factor in returning to all the activities you enjoy.  Long-term outcomes after surgery are generally good, with most patients having reduced or no pain and greatly improved range of motion. In some cases, however, even after several years, the motion does not return completely and a small amount of stiffness remains.  Although uncommon, frozen shoulder can recur, especially if a contributing factor like diabetes is still present.

## 2020-12-21 NOTE — LETTER
December 21, 2020      Joes Sahu MD  [address]  [place] [address]           [address]  [place] [address]  Phone: [phone]          Patient: Sara Ferro   MR Number: [id number]   YOB: 1965   Date of Visit: 12/21/2020       Dear Dr. Jose Sahu:    Thank you for referring Sara Ferro to me for evaluation. Attached you will find relevant portions of my assessment and plan of care.    If you have questions, please do not hesitate to call me. I look forward to following Sara Ferro along with you.    Sincerely,    Jay Jay Dumont MD    Enclosure  CC:  No Recipients    If you would like to receive this communication electronically, please contact [email or web address] or [phone] to request more information on [employer] access.    For providers and/or their staff who would like to refer a patient to [facility], please contact us through our one-stop-shop provider referral line, Vamshi Olvera, at [phone].    If you feel you have received this communication in error or would no longer like to receive these types of communications, please e-mail [email or web address]

## 2020-12-21 NOTE — PROCEDURES
Large Joint Aspiration/Injection: L subacromial bursa    Date/Time: 12/21/2020 9:30 AM  Performed by: Jay Jay Dumont MD  Authorized by: Jay Jay Dumont MD     Consent Done?:  Yes (Verbal)  Indications:  Pain  Site marked: the procedure site was marked    Timeout: prior to procedure the correct patient, procedure, and site was verified    Local anesthetic:  Lidocaine 1% without epinephrine and bupivacaine 0.25% without epinephrine  Anesthetic total (ml):  6      Details:  Needle Size:  20 G  Ultrasonic Guidance for needle placement?: No    Approach:  Posterior  Location:  Shoulder  Site:  L subacromial bursa  Medications:  40 mg triamcinolone acetonide 40 mg/mL  Patient tolerance:  Patient tolerated the procedure well with no immediate complications

## 2020-12-23 DIAGNOSIS — Z12.11 COLON CANCER SCREENING: Primary | ICD-10-CM

## 2020-12-30 ENCOUNTER — TELEPHONE (OUTPATIENT)
Dept: FAMILY MEDICINE | Facility: CLINIC | Age: 55
End: 2020-12-30

## 2020-12-30 ENCOUNTER — DOCUMENTATION ONLY (OUTPATIENT)
Dept: FAMILY MEDICINE | Facility: CLINIC | Age: 55
End: 2020-12-30

## 2020-12-30 NOTE — TELEPHONE ENCOUNTER
----- Message from Jose Sahu MD sent at 12/23/2020  3:09 PM CST -----  Regarding: FIT  Ordered a FIT test for her. Thanks.

## 2021-01-04 ENCOUNTER — PATIENT MESSAGE (OUTPATIENT)
Dept: ADMINISTRATIVE | Facility: HOSPITAL | Age: 56
End: 2021-01-04

## 2021-01-28 ENCOUNTER — PATIENT OUTREACH (OUTPATIENT)
Dept: ADMINISTRATIVE | Facility: OTHER | Age: 56
End: 2021-01-28

## 2021-02-01 ENCOUNTER — OFFICE VISIT (OUTPATIENT)
Dept: ORTHOPEDICS | Facility: CLINIC | Age: 56
End: 2021-02-01
Payer: COMMERCIAL

## 2021-02-01 VITALS — BODY MASS INDEX: 31.28 KG/M2 | RESPIRATION RATE: 18 BRPM | WEIGHT: 170 LBS | HEIGHT: 62 IN

## 2021-02-01 DIAGNOSIS — M75.02 ADHESIVE CAPSULITIS OF LEFT SHOULDER: Primary | ICD-10-CM

## 2021-02-01 PROCEDURE — 99999 PR PBB SHADOW E&M-EST. PATIENT-LVL III: ICD-10-PCS | Mod: PBBFAC,,, | Performed by: ORTHOPAEDIC SURGERY

## 2021-02-01 PROCEDURE — 99213 OFFICE O/P EST LOW 20 MIN: CPT | Mod: S$GLB,,, | Performed by: ORTHOPAEDIC SURGERY

## 2021-02-01 PROCEDURE — 3008F PR BODY MASS INDEX (BMI) DOCUMENTED: ICD-10-PCS | Mod: CPTII,S$GLB,, | Performed by: ORTHOPAEDIC SURGERY

## 2021-02-01 PROCEDURE — 3008F BODY MASS INDEX DOCD: CPT | Mod: CPTII,S$GLB,, | Performed by: ORTHOPAEDIC SURGERY

## 2021-02-01 PROCEDURE — 1125F AMNT PAIN NOTED PAIN PRSNT: CPT | Mod: S$GLB,,, | Performed by: ORTHOPAEDIC SURGERY

## 2021-02-01 PROCEDURE — 99999 PR PBB SHADOW E&M-EST. PATIENT-LVL III: CPT | Mod: PBBFAC,,, | Performed by: ORTHOPAEDIC SURGERY

## 2021-02-01 PROCEDURE — 1125F PR PAIN SEVERITY QUANTIFIED, PAIN PRESENT: ICD-10-PCS | Mod: S$GLB,,, | Performed by: ORTHOPAEDIC SURGERY

## 2021-02-01 PROCEDURE — 99213 PR OFFICE/OUTPT VISIT, EST, LEVL III, 20-29 MIN: ICD-10-PCS | Mod: S$GLB,,, | Performed by: ORTHOPAEDIC SURGERY

## 2021-02-08 ENCOUNTER — DOCUMENTATION ONLY (OUTPATIENT)
Dept: REHABILITATION | Facility: HOSPITAL | Age: 56
End: 2021-02-08

## 2021-03-03 ENCOUNTER — TELEPHONE (OUTPATIENT)
Dept: FAMILY MEDICINE | Facility: CLINIC | Age: 56
End: 2021-03-03

## 2021-03-10 ENCOUNTER — PATIENT OUTREACH (OUTPATIENT)
Dept: ADMINISTRATIVE | Facility: OTHER | Age: 56
End: 2021-03-10

## 2021-03-15 ENCOUNTER — OFFICE VISIT (OUTPATIENT)
Dept: ORTHOPEDICS | Facility: CLINIC | Age: 56
End: 2021-03-15
Payer: COMMERCIAL

## 2021-03-15 VITALS — HEIGHT: 62 IN | RESPIRATION RATE: 16 BRPM | WEIGHT: 170 LBS | BODY MASS INDEX: 31.28 KG/M2

## 2021-03-15 DIAGNOSIS — M75.02 ADHESIVE CAPSULITIS OF LEFT SHOULDER: Primary | ICD-10-CM

## 2021-03-15 PROCEDURE — 99213 OFFICE O/P EST LOW 20 MIN: CPT | Mod: 25,S$GLB,, | Performed by: ORTHOPAEDIC SURGERY

## 2021-03-15 PROCEDURE — 3008F PR BODY MASS INDEX (BMI) DOCUMENTED: ICD-10-PCS | Mod: CPTII,S$GLB,, | Performed by: ORTHOPAEDIC SURGERY

## 2021-03-15 PROCEDURE — 99999 PR PBB SHADOW E&M-EST. PATIENT-LVL III: ICD-10-PCS | Mod: PBBFAC,,, | Performed by: ORTHOPAEDIC SURGERY

## 2021-03-15 PROCEDURE — 3008F BODY MASS INDEX DOCD: CPT | Mod: CPTII,S$GLB,, | Performed by: ORTHOPAEDIC SURGERY

## 2021-03-15 PROCEDURE — 99213 PR OFFICE/OUTPT VISIT, EST, LEVL III, 20-29 MIN: ICD-10-PCS | Mod: 25,S$GLB,, | Performed by: ORTHOPAEDIC SURGERY

## 2021-03-15 PROCEDURE — 99999 PR PBB SHADOW E&M-EST. PATIENT-LVL III: CPT | Mod: PBBFAC,,, | Performed by: ORTHOPAEDIC SURGERY

## 2021-03-15 PROCEDURE — 20610 DRAIN/INJ JOINT/BURSA W/O US: CPT | Mod: LT,S$GLB,, | Performed by: ORTHOPAEDIC SURGERY

## 2021-03-15 PROCEDURE — 20610 LARGE JOINT ASPIRATION/INJECTION: L SUBACROMIAL BURSA: ICD-10-PCS | Mod: LT,S$GLB,, | Performed by: ORTHOPAEDIC SURGERY

## 2021-03-15 RX ORDER — TRIAMCINOLONE ACETONIDE 40 MG/ML
40 INJECTION, SUSPENSION INTRA-ARTICULAR; INTRAMUSCULAR
Status: DISCONTINUED | OUTPATIENT
Start: 2021-03-15 | End: 2021-08-11

## 2021-03-15 RX ADMIN — TRIAMCINOLONE ACETONIDE 40 MG: 40 INJECTION, SUSPENSION INTRA-ARTICULAR; INTRAMUSCULAR at 03:03

## 2021-04-25 ENCOUNTER — PATIENT OUTREACH (OUTPATIENT)
Dept: ADMINISTRATIVE | Facility: OTHER | Age: 56
End: 2021-04-25

## 2021-05-27 DIAGNOSIS — Z12.11 COLON CANCER SCREENING: Primary | ICD-10-CM

## 2021-07-20 DIAGNOSIS — Z12.11 COLON CANCER SCREENING: Primary | ICD-10-CM

## 2021-08-10 ENCOUNTER — TELEPHONE (OUTPATIENT)
Dept: FAMILY MEDICINE | Facility: CLINIC | Age: 56
End: 2021-08-10

## 2021-08-11 ENCOUNTER — OFFICE VISIT (OUTPATIENT)
Dept: FAMILY MEDICINE | Facility: CLINIC | Age: 56
End: 2021-08-11
Payer: COMMERCIAL

## 2021-08-11 DIAGNOSIS — J06.9 VIRAL URI WITH COUGH: Primary | ICD-10-CM

## 2021-08-11 PROCEDURE — 1160F RVW MEDS BY RX/DR IN RCRD: CPT | Mod: CPTII,,, | Performed by: FAMILY MEDICINE

## 2021-08-11 PROCEDURE — 99213 OFFICE O/P EST LOW 20 MIN: CPT | Mod: 95,,, | Performed by: FAMILY MEDICINE

## 2021-08-11 PROCEDURE — 99213 PR OFFICE/OUTPT VISIT, EST, LEVL III, 20-29 MIN: ICD-10-PCS | Mod: 95,,, | Performed by: FAMILY MEDICINE

## 2021-08-11 PROCEDURE — 1159F MED LIST DOCD IN RCRD: CPT | Mod: CPTII,,, | Performed by: FAMILY MEDICINE

## 2021-08-11 PROCEDURE — 1160F PR REVIEW ALL MEDS BY PRESCRIBER/CLIN PHARMACIST DOCUMENTED: ICD-10-PCS | Mod: CPTII,,, | Performed by: FAMILY MEDICINE

## 2021-08-11 PROCEDURE — 1159F PR MEDICATION LIST DOCUMENTED IN MEDICAL RECORD: ICD-10-PCS | Mod: CPTII,,, | Performed by: FAMILY MEDICINE

## 2021-08-11 RX ORDER — BENZONATATE 200 MG/1
200 CAPSULE ORAL 3 TIMES DAILY PRN
Qty: 30 CAPSULE | Refills: 0 | Status: SHIPPED | OUTPATIENT
Start: 2021-08-11 | End: 2021-08-21

## 2021-08-11 RX ORDER — AZITHROMYCIN 250 MG/1
TABLET, FILM COATED ORAL
Qty: 6 TABLET | Refills: 0 | Status: SHIPPED | OUTPATIENT
Start: 2021-08-11 | End: 2023-05-02

## 2021-08-11 RX ORDER — METHYLPREDNISOLONE 4 MG/1
TABLET ORAL
Qty: 1 PACKAGE | Refills: 0 | Status: SHIPPED | OUTPATIENT
Start: 2021-08-11 | End: 2023-05-02

## 2021-08-12 RX ORDER — AZITHROMYCIN 250 MG/1
TABLET, FILM COATED ORAL
Qty: 6 TABLET | Refills: 0 | Status: CANCELLED | OUTPATIENT
Start: 2021-08-12

## 2021-08-16 ENCOUNTER — TELEPHONE (OUTPATIENT)
Dept: FAMILY MEDICINE | Facility: CLINIC | Age: 56
End: 2021-08-16

## 2021-08-16 DIAGNOSIS — Z20.822 ENCOUNTER BY TELEHEALTH FOR SUSPECTED COVID-19: Primary | ICD-10-CM

## 2021-08-18 ENCOUNTER — OFFICE VISIT (OUTPATIENT)
Dept: FAMILY MEDICINE | Facility: CLINIC | Age: 56
End: 2021-08-18
Payer: COMMERCIAL

## 2021-08-18 DIAGNOSIS — U07.1 COVID-19: Primary | ICD-10-CM

## 2021-08-18 DIAGNOSIS — R06.00 DYSPNEA, UNSPECIFIED TYPE: ICD-10-CM

## 2021-08-18 DIAGNOSIS — J00 ACUTE RHINITIS: ICD-10-CM

## 2021-08-18 PROCEDURE — 99213 OFFICE O/P EST LOW 20 MIN: CPT | Mod: 95,,, | Performed by: NURSE PRACTITIONER

## 2021-08-18 PROCEDURE — 1160F RVW MEDS BY RX/DR IN RCRD: CPT | Mod: CPTII,,, | Performed by: NURSE PRACTITIONER

## 2021-08-18 PROCEDURE — 1159F MED LIST DOCD IN RCRD: CPT | Mod: CPTII,,, | Performed by: NURSE PRACTITIONER

## 2021-08-18 PROCEDURE — 99213 PR OFFICE/OUTPT VISIT, EST, LEVL III, 20-29 MIN: ICD-10-PCS | Mod: 95,,, | Performed by: NURSE PRACTITIONER

## 2021-08-18 PROCEDURE — 1159F PR MEDICATION LIST DOCUMENTED IN MEDICAL RECORD: ICD-10-PCS | Mod: CPTII,,, | Performed by: NURSE PRACTITIONER

## 2021-08-18 PROCEDURE — 1160F PR REVIEW ALL MEDS BY PRESCRIBER/CLIN PHARMACIST DOCUMENTED: ICD-10-PCS | Mod: CPTII,,, | Performed by: NURSE PRACTITIONER

## 2021-08-18 RX ORDER — ALBUTEROL SULFATE 90 UG/1
2 AEROSOL, METERED RESPIRATORY (INHALATION) EVERY 4 HOURS PRN
Qty: 18 G | Refills: 1 | Status: SHIPPED | OUTPATIENT
Start: 2021-08-18 | End: 2023-05-02

## 2021-08-18 RX ORDER — AZELASTINE 1 MG/ML
1 SPRAY, METERED NASAL 2 TIMES DAILY
Qty: 30 ML | Refills: 0 | Status: SHIPPED | OUTPATIENT
Start: 2021-08-18 | End: 2023-05-02

## 2021-11-03 DIAGNOSIS — Z12.31 OTHER SCREENING MAMMOGRAM: ICD-10-CM

## 2021-12-15 ENCOUNTER — PATIENT MESSAGE (OUTPATIENT)
Dept: ADMINISTRATIVE | Facility: HOSPITAL | Age: 56
End: 2021-12-15
Payer: COMMERCIAL

## 2021-12-15 ENCOUNTER — PATIENT OUTREACH (OUTPATIENT)
Dept: ADMINISTRATIVE | Facility: HOSPITAL | Age: 56
End: 2021-12-15
Payer: COMMERCIAL

## 2021-12-23 ENCOUNTER — PATIENT OUTREACH (OUTPATIENT)
Dept: ADMINISTRATIVE | Facility: HOSPITAL | Age: 56
End: 2021-12-23
Payer: COMMERCIAL

## 2021-12-28 ENCOUNTER — PATIENT OUTREACH (OUTPATIENT)
Dept: ADMINISTRATIVE | Facility: HOSPITAL | Age: 56
End: 2021-12-28
Payer: COMMERCIAL

## 2022-05-17 DIAGNOSIS — Z12.11 COLON CANCER SCREENING: Primary | ICD-10-CM

## 2022-05-18 ENCOUNTER — TELEPHONE (OUTPATIENT)
Dept: FAMILY MEDICINE | Facility: CLINIC | Age: 57
End: 2022-05-18
Payer: COMMERCIAL

## 2022-05-18 NOTE — TELEPHONE ENCOUNTER
----- Message from Jose Sahu MD sent at 5/17/2022  8:22 AM CDT -----  Regarding: Colon Cancer Screening Due  Patient is due for colon cancer screening. Please send FIT test to patient and call patient to recommend sending FIT kit back. Thanks!

## 2022-05-31 ENCOUNTER — PATIENT MESSAGE (OUTPATIENT)
Dept: ADMINISTRATIVE | Facility: HOSPITAL | Age: 57
End: 2022-05-31
Payer: COMMERCIAL

## 2022-08-24 ENCOUNTER — PATIENT MESSAGE (OUTPATIENT)
Dept: ADMINISTRATIVE | Facility: HOSPITAL | Age: 57
End: 2022-08-24
Payer: COMMERCIAL

## 2022-09-13 ENCOUNTER — PATIENT MESSAGE (OUTPATIENT)
Dept: FAMILY MEDICINE | Facility: CLINIC | Age: 57
End: 2022-09-13
Payer: COMMERCIAL

## 2023-02-20 NOTE — PATIENT INSTRUCTIONS
Outpatient Care Manager Note: Patient identified for SNF/[facility] and PCP notified  Chart review completed  Patient discharged from [facility] on 2/3/23 to home with RANDEE [place]  Call to home, spoke with patient and [facility] pathway interventions reviewed  including:  • PCP visit within 1 week- seeing next week 2/27/23  Did see cardiologist 2/10/23 and Endo NP 2/15/23  • Avoiding NSAIDs  • Adequate nutrition and hydration  • Keeping BP >130 if normal BP not lower  • Checking temperature  • Assessing for weight gain or loss and edema changes since home Weighs daily- is 270# today and about 267# when first home from Rehab on 2/4  Reviewed HF daily care, low sodium diet and when to call cardiology  • Medication Review- done at office visit and home care RN  States has all medication and taking as prescribed  • CMP ordered by endocrinology NP and patient encouraged to get before his PCP appointment next week    States working with home PT and "getting around ok"  Denies concerns or care needs  Strengthening: Isometric Flexion        Using wall for resistance, press right fist into ball using light pressure. Hold __5__ seconds.  Repeat _10___ times per set. Do __1-2__ sets per session. Do __1-2__ sessions per day.     [email or web address]     Copyright © [employer].com. All rights reserved.   Strengthening: Isometric Extension        Using wall for resistance, press back of left arm into ball using light pressure. Hold __5__ seconds.  Repeat __10__ times per set. Do __1-2__ sets per session. Do _1-2___ sessions per day.     [email or web address]     Copyright © [employer].com. All rights reserved.   Strengthening: Isometric Abduction        Using wall for resistance, press left arm into ball using light pressure. Hold _5___ seconds.  Repeat __10__ times per set. Do __1-2__ sets per session. Do _1-2___ sessions per day.     [email or web address]     Copyright © [employer].com. All rights reserved.   Strengthening: Isometric Internal Rotation        Using door frame for resistance, press palm of right hand into ball using light pressure. Keep elbow in at side. Hold __5__ seconds.  Repeat __10__ times per set. Do __1-2__ sets per session. Do _1-2___ sessions per day.     [email or web address]     Copyright © [employer].com. All rights reserved.   Strengthening: Isometric Abduction        Using wall for resistance, press left arm into ball using light pressure. Hold __5__ seconds.  Repeat _10___ times per set. Do __1-2__ sets per session. Do _1-2___ sessions per day.     [email or web address]     Copyright © [employer].com. All rights reserved.   Scapular Retraction: Elbow Flexion (Standing)        With elbows bent to 90°, pinch shoulder blades together and rotate arms out, keeping elbows bent.  Repeat __10__ times per set. Do _2___ sets per session. Do __1-2__ sessions per day.     [email or web address]     Copyright © [employer].com. All rights reserved.   Strengthening: Shoulder Shrug (Phase 1)        Shrug shoulders up and down, forward and  backward.  Repeat __10__ times per set. Do __2__ sets per session. Do _1-2___ sessions per day.     [email or web address]     Copyright © [employer]. All rights reserved.

## 2023-04-11 ENCOUNTER — PATIENT MESSAGE (OUTPATIENT)
Dept: ADMINISTRATIVE | Facility: HOSPITAL | Age: 58
End: 2023-04-11
Payer: COMMERCIAL

## 2023-05-02 ENCOUNTER — OFFICE VISIT (OUTPATIENT)
Dept: FAMILY MEDICINE | Facility: CLINIC | Age: 58
End: 2023-05-02
Payer: COMMERCIAL

## 2023-05-02 ENCOUNTER — TELEPHONE (OUTPATIENT)
Dept: FAMILY MEDICINE | Facility: CLINIC | Age: 58
End: 2023-05-02
Payer: COMMERCIAL

## 2023-05-02 VITALS
SYSTOLIC BLOOD PRESSURE: 128 MMHG | DIASTOLIC BLOOD PRESSURE: 72 MMHG | RESPIRATION RATE: 16 BRPM | WEIGHT: 143.31 LBS | OXYGEN SATURATION: 100 % | BODY MASS INDEX: 26.37 KG/M2 | HEART RATE: 88 BPM | HEIGHT: 62 IN | TEMPERATURE: 98 F

## 2023-05-02 DIAGNOSIS — R79.9 ABNORMAL FINDING OF BLOOD CHEMISTRY, UNSPECIFIED: ICD-10-CM
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
## 2023-05-02 DIAGNOSIS — Z12.31 ENCOUNTER FOR SCREENING MAMMOGRAM FOR MALIGNANT NEOPLASM OF BREAST: ICD-10-CM

## 2023-05-02 DIAGNOSIS — Z12.11 COLON CANCER SCREENING: ICD-10-CM

## 2023-05-02 DIAGNOSIS — M54.50 ACUTE MIDLINE LOW BACK PAIN WITHOUT SCIATICA: ICD-10-CM

## 2023-05-02 DIAGNOSIS — M54.2 NECK PAIN: ICD-10-CM

## 2023-05-02 DIAGNOSIS — R25.2 SPASM: ICD-10-CM

## 2023-05-02 PROCEDURE — 1159F PR MEDICATION LIST DOCUMENTED IN MEDICAL RECORD: ICD-10-PCS | Mod: CPTII,S$GLB,, | Performed by: STUDENT IN AN ORGANIZED HEALTH CARE EDUCATION/TRAINING PROGRAM

## 2023-05-02 PROCEDURE — 3074F PR MOST RECENT SYSTOLIC BLOOD PRESSURE < 130 MM HG: ICD-10-PCS | Mod: CPTII,S$GLB,, | Performed by: STUDENT IN AN ORGANIZED HEALTH CARE EDUCATION/TRAINING PROGRAM

## 2023-05-02 PROCEDURE — 99999 PR PBB SHADOW E&M-EST. PATIENT-LVL IV: CPT | Mod: PBBFAC,,, | Performed by: STUDENT IN AN ORGANIZED HEALTH CARE EDUCATION/TRAINING PROGRAM

## 2023-05-02 PROCEDURE — 99214 PR OFFICE/OUTPT VISIT, EST, LEVL IV, 30-39 MIN: ICD-10-PCS | Mod: S$GLB,,, | Performed by: STUDENT IN AN ORGANIZED HEALTH CARE EDUCATION/TRAINING PROGRAM

## 2023-05-02 PROCEDURE — 3008F PR BODY MASS INDEX (BMI) DOCUMENTED: ICD-10-PCS | Mod: CPTII,S$GLB,, | Performed by: STUDENT IN AN ORGANIZED HEALTH CARE EDUCATION/TRAINING PROGRAM

## 2023-05-02 PROCEDURE — 3008F BODY MASS INDEX DOCD: CPT | Mod: CPTII,S$GLB,, | Performed by: STUDENT IN AN ORGANIZED HEALTH CARE EDUCATION/TRAINING PROGRAM

## 2023-05-02 PROCEDURE — 1159F MED LIST DOCD IN RCRD: CPT | Mod: CPTII,S$GLB,, | Performed by: STUDENT IN AN ORGANIZED HEALTH CARE EDUCATION/TRAINING PROGRAM

## 2023-05-02 PROCEDURE — 99999 PR PBB SHADOW E&M-EST. PATIENT-LVL IV: ICD-10-PCS | Mod: PBBFAC,,, | Performed by: STUDENT IN AN ORGANIZED HEALTH CARE EDUCATION/TRAINING PROGRAM

## 2023-05-02 PROCEDURE — 3074F SYST BP LT 130 MM HG: CPT | Mod: CPTII,S$GLB,, | Performed by: STUDENT IN AN ORGANIZED HEALTH CARE EDUCATION/TRAINING PROGRAM

## 2023-05-02 PROCEDURE — 3078F DIAST BP <80 MM HG: CPT | Mod: CPTII,S$GLB,, | Performed by: STUDENT IN AN ORGANIZED HEALTH CARE EDUCATION/TRAINING PROGRAM

## 2023-05-02 PROCEDURE — 99214 OFFICE O/P EST MOD 30 MIN: CPT | Mod: S$GLB,,, | Performed by: STUDENT IN AN ORGANIZED HEALTH CARE EDUCATION/TRAINING PROGRAM

## 2023-05-02 PROCEDURE — 3078F PR MOST RECENT DIASTOLIC BLOOD PRESSURE < 80 MM HG: ICD-10-PCS | Mod: CPTII,S$GLB,, | Performed by: STUDENT IN AN ORGANIZED HEALTH CARE EDUCATION/TRAINING PROGRAM

## 2023-05-02 RX ORDER — TIZANIDINE 2 MG/1
2 TABLET ORAL EVERY 8 HOURS PRN
Qty: 60 TABLET | Refills: 2 | Status: SHIPPED | OUTPATIENT
Start: 2023-05-02 | End: 2023-06-01

## 2023-05-02 RX ORDER — METHYLPREDNISOLONE 4 MG/1
TABLET ORAL
Qty: 21 TABLET | Refills: 0 | Status: SHIPPED | OUTPATIENT
Start: 2023-05-02 | End: 2023-05-11

## 2023-05-02 RX ORDER — CELECOXIB 200 MG/1
200 CAPSULE ORAL 2 TIMES DAILY PRN
Qty: 60 CAPSULE | Refills: 2 | Status: SHIPPED | OUTPATIENT
Start: 2023-05-02 | End: 2023-05-11
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
## 2023-05-04 ENCOUNTER — TELEPHONE (OUTPATIENT)
Dept: FAMILY MEDICINE | Facility: CLINIC | Age: 58
End: 2023-05-04
Payer: COMMERCIAL

## 2023-05-04 NOTE — TELEPHONE ENCOUNTER
----- Message from Yousif Bhatt sent at 5/4/2023 10:41 AM CDT -----      Name of Who is Calling:PT          What is the request in detail:PT is requesting a call back to discuss if her paperwork can be be sent to a outside diagnostic center because her insurance will not cover [facility]. Please be Advised!     Fax# [phone]        Can the clinic reply by [facility]:no          What Number to Call Back if not in [phone]

## 2023-05-05 ENCOUNTER — TELEPHONE (OUTPATIENT)
Dept: FAMILY MEDICINE | Facility: CLINIC | Age: 58
End: 2023-05-05
Payer: COMMERCIAL

## 2023-05-05 DIAGNOSIS — M54.9 BACK PAIN, UNSPECIFIED BACK LOCATION, UNSPECIFIED BACK PAIN LATERALITY, UNSPECIFIED CHRONICITY: ICD-10-CM

## 2023-05-05 DIAGNOSIS — M54.2 CERVICALGIA: Primary | ICD-10-CM

## 2023-05-05 NOTE — TELEPHONE ENCOUNTER
----- Message from Booker Patten sent at 5/5/2023  9:39 AM CDT -----  Type: Needs Medical Advice  Who Called:  Patient    Best Call Back Number: [phone]  Additional Information: Patient states that her orders for an X ray and CT were [facility]    Please call to advise

## 2023-05-05 NOTE — TELEPHONE ENCOUNTER
Unable to reach [facility] representative. New orders faxed to [facility]. Called and spoke with pt. Informed pt that new orders have been faxed. Pt verbalized understanding.

## 2023-05-05 NOTE — TELEPHONE ENCOUNTER
----- Message from Shala Keller sent at 5/5/2023  9:42 AM CDT -----  Name of Who is Calling: [facility]        What is the request in detail: needs auth changed from [facility] to [facility]        Can the clinic reply by [facility]: no        What Number to Call Back if not in [facility]: [phone] ext [phone]

## 2023-05-09 ENCOUNTER — TELEPHONE (OUTPATIENT)
Dept: FAMILY MEDICINE | Facility: CLINIC | Age: 58
End: 2023-05-09
Payer: COMMERCIAL
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
## 2023-05-11 ENCOUNTER — OFFICE VISIT (OUTPATIENT)
Dept: FAMILY MEDICINE | Facility: CLINIC | Age: 58
End: 2023-05-11
Payer: COMMERCIAL

## 2023-05-11 VITALS
SYSTOLIC BLOOD PRESSURE: 120 MMHG | BODY MASS INDEX: 26.37 KG/M2 | WEIGHT: 143.31 LBS | TEMPERATURE: 99 F | HEART RATE: 86 BPM | OXYGEN SATURATION: 98 % | HEIGHT: 62 IN | DIASTOLIC BLOOD PRESSURE: 70 MMHG | RESPIRATION RATE: 16 BRPM

## 2023-05-11 DIAGNOSIS — R53.83 FATIGUE, UNSPECIFIED TYPE: ICD-10-CM

## 2023-05-11 DIAGNOSIS — M54.2 NECK PAIN: ICD-10-CM

## 2023-05-11 DIAGNOSIS — R51.9 NONINTRACTABLE HEADACHE, UNSPECIFIED CHRONICITY PATTERN, UNSPECIFIED HEADACHE TYPE: Primary | ICD-10-CM

## 2023-05-11 PROCEDURE — 99214 OFFICE O/P EST MOD 30 MIN: CPT | Mod: 25,S$GLB,, | Performed by: STUDENT IN AN ORGANIZED HEALTH CARE EDUCATION/TRAINING PROGRAM

## 2023-05-11 PROCEDURE — 96372 PR INJECTION,THERAP/PROPH/DIAG2ST, IM OR SUBCUT: ICD-10-PCS | Mod: S$GLB,,, | Performed by: STUDENT IN AN ORGANIZED HEALTH CARE EDUCATION/TRAINING PROGRAM

## 2023-05-11 PROCEDURE — 1159F PR MEDICATION LIST DOCUMENTED IN MEDICAL RECORD: ICD-10-PCS | Mod: CPTII,S$GLB,, | Performed by: STUDENT IN AN ORGANIZED HEALTH CARE EDUCATION/TRAINING PROGRAM

## 2023-05-11 PROCEDURE — 3074F SYST BP LT 130 MM HG: CPT | Mod: CPTII,S$GLB,, | Performed by: STUDENT IN AN ORGANIZED HEALTH CARE EDUCATION/TRAINING PROGRAM

## 2023-05-11 PROCEDURE — 3008F BODY MASS INDEX DOCD: CPT | Mod: CPTII,S$GLB,, | Performed by: STUDENT IN AN ORGANIZED HEALTH CARE EDUCATION/TRAINING PROGRAM

## 2023-05-11 PROCEDURE — 3008F PR BODY MASS INDEX (BMI) DOCUMENTED: ICD-10-PCS | Mod: CPTII,S$GLB,, | Performed by: STUDENT IN AN ORGANIZED HEALTH CARE EDUCATION/TRAINING PROGRAM

## 2023-05-11 PROCEDURE — 99214 PR OFFICE/OUTPT VISIT, EST, LEVL IV, 30-39 MIN: ICD-10-PCS | Mod: 25,S$GLB,, | Performed by: STUDENT IN AN ORGANIZED HEALTH CARE EDUCATION/TRAINING PROGRAM

## 2023-05-11 PROCEDURE — 3078F PR MOST RECENT DIASTOLIC BLOOD PRESSURE < 80 MM HG: ICD-10-PCS | Mod: CPTII,S$GLB,, | Performed by: STUDENT IN AN ORGANIZED HEALTH CARE EDUCATION/TRAINING PROGRAM

## 2023-05-11 PROCEDURE — 3074F PR MOST RECENT SYSTOLIC BLOOD PRESSURE < 130 MM HG: ICD-10-PCS | Mod: CPTII,S$GLB,, | Performed by: STUDENT IN AN ORGANIZED HEALTH CARE EDUCATION/TRAINING PROGRAM

## 2023-05-11 PROCEDURE — 99999 PR PBB SHADOW E&M-EST. PATIENT-LVL III: ICD-10-PCS | Mod: PBBFAC,,, | Performed by: STUDENT IN AN ORGANIZED HEALTH CARE EDUCATION/TRAINING PROGRAM

## 2023-05-11 PROCEDURE — 99999 PR PBB SHADOW E&M-EST. PATIENT-LVL III: CPT | Mod: PBBFAC,,, | Performed by: STUDENT IN AN ORGANIZED HEALTH CARE EDUCATION/TRAINING PROGRAM

## 2023-05-11 PROCEDURE — 96372 THER/PROPH/DIAG INJ SC/IM: CPT | Mod: S$GLB,,, | Performed by: STUDENT IN AN ORGANIZED HEALTH CARE EDUCATION/TRAINING PROGRAM

## 2023-05-11 PROCEDURE — 1159F MED LIST DOCD IN RCRD: CPT | Mod: CPTII,S$GLB,, | Performed by: STUDENT IN AN ORGANIZED HEALTH CARE EDUCATION/TRAINING PROGRAM

## 2023-05-11 PROCEDURE — 3078F DIAST BP <80 MM HG: CPT | Mod: CPTII,S$GLB,, | Performed by: STUDENT IN AN ORGANIZED HEALTH CARE EDUCATION/TRAINING PROGRAM

## 2023-05-11 RX ORDER — KETOROLAC TROMETHAMINE 30 MG/ML
60 INJECTION, SOLUTION INTRAMUSCULAR; INTRAVENOUS
Status: COMPLETED | OUTPATIENT
Start: 2023-05-11 | End: 2023-05-11

## 2023-05-11 RX ORDER — KETOROLAC TROMETHAMINE 30 MG/ML
30 INJECTION, SOLUTION INTRAMUSCULAR; INTRAVENOUS ONCE
Qty: 1 ML | Refills: 0 | Status: SHIPPED | OUTPATIENT
Start: 2023-05-11 | End: 2023-05-11

## 2023-05-11 RX ORDER — MELOXICAM 15 MG/1
15 TABLET ORAL DAILY PRN
Qty: 30 TABLET | Refills: 3 | Status: SHIPPED | OUTPATIENT
Start: 2023-05-11

## 2023-05-11 RX ADMIN — KETOROLAC TROMETHAMINE 60 MG: 30 INJECTION, SOLUTION INTRAMUSCULAR; INTRAVENOUS at 11:05

## 2023-05-11 NOTE — PROGRESS NOTES
Identified name and . Administered 60 mg Ketorolac, IM. Patient tolerated well, aseptic technique maintained. Pain scale 0/10 with injection. No residual bleeding at insertion site noted.
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
## 2023-05-11 NOTE — PATIENT INSTRUCTIONS
Pravin Ruiz, Please read below to learn more on healthy habits.  Most of my patients read it.       If you are due for any health screening(s) below please notify me so we can arrange them to be ordered and scheduled. Most healthy patients at your age complete them, but you are free to accept or refuse.     If you can't do it, I'll definitely understand. If you can, I'd certainly appreciate it!     Tests to Keep You Healthy    Mammogram: ORDERED BUT NOT SCHEDULED  Colon Cancer Screening: ORDERED      Breast Cancer Screening    Breast cancer is the second most common cancer in women after skin cancer, and the second leading cause of death from cancer after lung cancer. Mammograms can detect breast cancer early, which significantly increases the chances of curing the cancer.      A screening mammogram is an x-ray image of the breasts used for early breast cancer detection. It can help reduce the number of deaths from breast cancer among women. To get a clear image, the breast is placed between two plastic plates to make it flat. How often a mammogram is needed depends on your age and your breast cancer risk.    Colon Cancer Screening    Of cancers affecting both men and women, colorectal cancer is the third leading cancer killer in the [place]. But it doesnt have to be. Screening can prevent colorectal cancer or find it at an early stage when treatment often leads to a cure.    A colonoscopy is the preferred test for detecting colon cancer. It is needed only once every 10 years if results are negative. While sedated, a flexible, lighted tube with a tiny camera is inserted into the rectum and advanced through the colon to look for cancers. An alternative screening test that is used at home and returned to the lab may also be used. It detects hidden blood in bowel movements which could indicate cancer in the colon. If results are positive, you will need a colonoscopy to determine if the blood is a sign of  cancer. This type of follow up (diagnostic) colonoscopy usually requires additional copays as required by your insurance provider. Please contact your PCP if you have any questions.                      Table of Contents:     Cancer prevention  Explanation on the different components of your blood work and interpretation  Frequently asked questions   Blood pressure log   E-Visits  E-Consults     Cancer Prevention    Why should you choose to get screened for cancer? One simple reason is because you are important. You matter and deserve to have the best health so you can fulfill your great potential.     Colon Cancer screening - Most colon cancers can be prevented by screening. In most cases a polyp in the colon can grow and is not cancerous at first, but it can become cancerous years later. A polyp is like a seed that can grow into a weed if it is left in the soil. If the seed is detected and removed, no weed sprouts. A FIT test/Cologuard test and colonoscopy can detect precancerous polyps and lead to the prevention of cancer. A polyp is just like a seed that can be removed before the roots take hold. A colonoscopy can remove these polyps and eliminate the chance that these polyps turn into cancer.     Cervical Cancer screening- A PAP smear can detect precancerous cells that can become cervical cancer and can lead to procedures to remove them. It is very important to get a PAP smear as investing these few minutes can help prevent a lot of trouble down the road. If you want to do the most you can do to spend more time with your family and friends', cancer screenings can help with that goal.     Breast Cancer screening- Mammograms can detect breast cancer before it has spread and early detection allows the ability to remove the lesion prior to any spread. It is similar to a small rotten spot on fruit. If the spoiled part is removed quickly it can preserve the rest of the fruit, but if it is left alone it will corrupt  "the whole peach.     Smoking Cessation- "Smoking is like a chimney. The tar builds up and causes a back draft which leads to a cough. Smoking is like sitting in a car with a blocked exhaust system." Smoking can increase your risk for lung, mouth, throat, nose, esophagus, bladder, kidney, ureter, pancreas, stomach, liver, cervix, ovary, bowel, and leukemia [2]. If you have smoked in the past, you might meet the criteria for a CT lung cancer screening.     Lung Cancer Screening - "The U.S. Preventive Services Task Force (USPSTF) recommendsexternal icon yearly lung cancer screening with LDCT for people who--have a 20 pack-year or more smoking history, and smoke now or have quit within the past 15 years, and are between 50 and 80 years old. A pack-year is smoking an average of one pack of cigarettes per day for one year. For example, a person could have a 20 pack-year history by smoking one pack a day for 20 years or two packs a day for 10 years." [3]    Hypertension - Common high blood pressure meds may lower colorectal cancer risk-BIB, July 6, 2020 - Hypertension Journal Report Medications commonly prescribed to treat high blood pressure may also reduce patients' colorectal cancer risk, according to new research published today in Hypertension, an American Heart Association journal." [4].     Low HbA1c - "Higher HbA1c levels (within both the non?diabetic and diabetic ranges) were associated with the risk of colorectal cancer (Model 2; P linear?=?0.009), especially for colon cancer." [5].    A healthy diet, exercise, limitation of alcohol use, certain infections, avoidance of radiation/environmental toxins, and staying lean lowers your cancer risk [6].     I want to empower you to make informed choices for your health. I have a listed below the most common blood components that we check for when you get blood work. I discuss what each component measures along with common reasons for why they can be abnormal. If you " are interested in understanding your blood work better, please read the lab explanation attached below.     Explanation of Lab Results    Please note: This information is included as a reference to help you better understand your lab results and is not be used for diagnosis.     Lipid Panel:  Cholesterol is a measure of cardiac risk and stroke risk. A lipid panel measures total cholesterol and provides readings which are broken down into 3 subsections: Triglycerides, HDL and LDL.    Total Cholesterol: Your total blood cholesterol is a measure of LDL cholesterol, HDL cholesterol, and other lipid components.  If your individual lipid level components are in the normal range and you have an elevated total cholesterol level we are generally not to concerned since we primarily look at the LDL cholesterol which is considered the bad cholesterol which can lead to heart attacks and strokes.  Your calculated cardiac risk is the most important factor in deciding if you would benefit from a cholesterol-lowering medication that the total cholesterol level.    Triglycerides are most diet and weight sensitive. They are affected easily by lifestyle changes. Ideally, this reading should be less than 150, although if the remainder of the cholesterol panel is within normal limits, we will tolerate upwards of 250-300. For the most part, if triglycerides are the only part of your cholesterol panel reading as 'abnormal', it is best to lose weight and modify your diet, including less saturated fat and less simple sugars. We only start medication to lower this is the level is greater than 500 since this can increase ones risk for pancreatitis. This is not the cholesterol type that leads to heart attacks.     HDL is your 'good cholesterol.' Ideally, the reading should be over 40 and is particularly helpful when it is greater than 60. Research indicates that high HDL is extremely protective; and if your HDL level is greater than 60, we  tolerate far worse LDL or triglyceride levels. For the most part, HDL is responsive to aerobic activity and ideal weight. We do not have medicines that increase the HDL reading very easily.    LDL is your 'bad cholesterol.' Our goals depend on how many cardiac risk factors you have.     High LDL: If you are diabetic or have had a heart attack, we like the LDL level to be less than 100. If you have 2 cardiac risk factors (such as diabetes, hypertension, family history, a low HDL and smoking), we like your LDL to be less than 130. If you have 0-2 cardiac risk factors, we like your LDL level to be less than 160, but we may not necessarily initiate medications to 190 unless you cannot lower it. The latest guidelines recommend to consider taking a statin only if your ASCVD cardiac risk score is at least greater than 7.5% and a strong recommendation if your risk score is greater than 10%.     Low LDL: Normal or low LDL is considered good and having an LDL below the normal range is not of a concern.     Complete Blood Count (CBC):    White blood cells: These are infection fighting cells. Mild fluctuations may represent minor illness at the time of blood draw. Marked fluctuations can represent immune diseases. This can also be elevated from smoking.     High WBC: Elevation in wbc can commonly be caused by an infection, steroid use, or any cause of inflammation such as many rheumatologic diseases, surgery, or trauma.      Low WBC: Many different things can cause this such as infections, medications, rheumatologic disorders, malignancies, nutritional deficiencies, and a normal variant in some individuals. If this occurs it is common practice to rule out a few viruses such as HIV, Hep C, B12, folate along with a a peripheral blood smear to look for abnormalities. If you are otherwise healthy with no concerning symptoms and the workup is negative we usually monitor it with yearly blood work.  If there are other abnormal cell  line abnormalities sometimes we refer to hematology to further evaluate.    Hemoglobin: A key indicator for anemia. Ranges depend on age. If you are significantly out of this range, we may need to talk with you.    High Hemoglobin: This can be elevated in some blood disorders, sleep apnea, chronic lung disease, kidney disease, testosterone use, dehydration, smoking, along with some other rare causes.     Low Hemoglobin: Many things can lead to this but the most common cause is an iron deficiency in females who lose blood during their periods, decreased absorption due to antacids, poor diet, sickle cell, anemia of chronic disease, malignancy, bleeding from the gut, along with some other less common causes. If you are a young female who has periods it is usually assumed that this is from that blood loss unless other symptoms or abnormal blood work is present. If you are above 45 and have an iron deficiency it is always recommended to get a colonoscopy to ensure that there is no lesion causing blood loss and to exclude malignancy.     Hematocrit: Another way of looking at anemia, much like your hemoglobin. If you are significantly out of this range, we may need to talk with you.    MCV: This looks at the size of your red blood cells.     High MCV:  A large number may indicate a B-12 deficiency, folate deficiency, alcohol use, liver disease, medication-induced phenomenon, or a need for further workup.    Low MCV: A small number may imply iron anemia or genetic disorder such as alpha-thalassemia. We may check iron studies called to see if you are low.    MCH: Much like MCV above.    Platelets: These are clotting factors. We tolerate a broad range in this. If your numbers are less than 100, we may need to work it up.     High Platelet Count: If your numbers are elevated, this could represent ongoing inflammation within the body which can be caused by any infection, illness, iron deficiency, or other malignancy. We usually  recheck it to monitor for improvement and if it does not resolve will work it up with more blood work.     Low Platelet Count: Many things can cause this such as infections, alcohol use, medications, liver disease, vitamin deficiencies, aspleenia, and some other rare blood disorders. If it persists many times we refer to hematology if a cause is not identified.     Iron:  This is not a reliable blood marker since this fluctuates throughout the day and if you are fasting many times your iron level in your blood is low but this does not necessarily mean you have a deficiency.  There are more reliable ways to measure your iron stores and these are discussed below.    Transferrin:  Many things can cause an abnormal result and this is not as important as some other labs mentioned below.    TIBC:  This is the total iron-binding capacity and this measures the total amount of iron that can be bound by proteins in the blood.  If you have an elevated TIBC this is a good marker that you could be low on iron and this is useful blood marker.  A simple way to think of this is seats in a car. The TIBC is the number of open seats that iron can sit in. If you have a high TIBC (number of open seats) that means you have a low iron level.     Ferritin:  A low ferritin is almost always caused from an iron deficiency.  A normal ferritin level does not need necessarily exclude an iron deficiency since many inflammatory conditions such as kidney disease, diabetes, arthritis, and obesity can raise this level hiding an iron deficiency.  If you are healthy with no inflammatory conditions this is a very reliable test for detecting an iron deficiency since nothing else lowers your ferritin level except a low iron level. Keep in mind that you can have an iron deficiency if your ferritin level is normal but your TIBC is elevated.  If you have a low iron level the next step is always to identify why you have a low iron level.    CMP (Comprehensive  Metabolic Panel):  Broadly, this is a test of organ function including the kidneys, liver, and electrolyte levels.    Glucose: This is primarily looking for diabetes. We like your blood glucose level to be less than 100 when fasting. Readings of 100-125 indicate what we call 'pre-diabetes' or 'glucose intolerance.' This does not necessarily indicate diabetes, but we may check another test called a hemoglobin A1C for confirmation. This level puts you at great risk for becoming a true diabetic and we would encourage the reduction of simple sugars and processed white flour as well as appropriate weight loss. If this number is greater than 125, it is likely you are diabetic; we will get an additional hemoglobin A1C test and will likely schedule you for an appointment. If you notice that your blood glucose is above 125 and we have not scheduled a follow-up appointment, please call us. Patients who are known diabetics can have readings greater than 125.    Urea Nitrogen: This is a kidney function and maybe elevated because of mild dehydration or because of excessive muscle breakdown from aggressive exercise habits.    Creatinine: This also is a kidney function test. It may be mildly elevated if you have particularly large muscle bulk or taking a supplement like creatine. It is related to the GFR. It is a muscle product that we track to look at your kidney function. If this is elevated and new, we may need to talk with you. If you have had this mildly elevated in the past, it is likely that we will just track it to ensure that it does not worsen quickly. Some medications may gently worsen this, namely blood pressure pills called ace inhibitors. To some degree, we will permit levels of up to 1.6.    Sodium: This is essentially the concentration of salt within the body. This may be mildly low because of dehydration, overhydration, diuretics, .    Potassium: This is an electrolyte that can cause muscular cramping or cardiac  difficulties. It is sometimes lowered by diuretic medications.    Chloride: For the most part, this is only relevant if the other electrolytes are abnormal.    CO2: This is a function of acid balance within the body. For the most part, mild abnormalities are not important and may represent a starvation or dehydration state when blood was drawn. Many medications can change this level as well such as diuretics, acetazolamide, calcium carbonate, laxatives, aspirin, and many others.    High CO2: Many things can cause this which includes dehydration, sleep apnea, and COPD.     Low CO2: Many things can lead to a low level and if you are generally healthy we are usually  not concerned. Diarrhea, renal disease, diabetes, and many medications can cause this.     Anion Gap: Only relevant if your CO2 is abnormal.    Calcium: This is not related to dietary intake of calcium. It may fluctuate gently based on the amount of protein within your body. If it is above 10.9, we may need to do additional testing. Many times if low the albumin level is low and once the corrected calcium level is calculated the calcium is in a normal range.     Total protein: This looks at protein within the body. Markedly elevated levels can represent an immune response and may require further workup.    Albumin: This may be elevated because of particularly high level of fitness. If it is markedly suppressed, it may represent organ dysfunction, particularly in the liver or kidneys.    AST and ALT: These are enzymes in the liver and if elevated can indicate liver damage.     Elevated AST/ALT: Many things can caused elevated liver enzymes and the most common reason is viral infections, alcohol use, medications, supplements, autoimmune, along with some other rare causes. One of the most common reasons for a mild elevation in liver enzymes (less than 3 times the upper limit) is fatty liver disease. Many individuals who have extra fat in their diet store this  in the liver and this can build up and cause a mild elevation. This is usually diagnosed with a liver ultrasound and exclusion of other causes. The only treatment for this is diet and exercise along with avoidance of liver toxic medications and alcohol. It is standard of care to rule our viral hepatitis and get imaging of the liver if elevated. This is monitored and if I feel concerned will refer to hepatology.     Alk Phos: Part of liver function or bones    High Alk Phos: elevated levels may indicate a liver injury or obstruction of bile flow. Elevated levels can also be seen in vitamin D deficiency, drug induced, or bone disorders.     Low Alk Phos: In most cases having a low Alkaline Phosphatase enzyme activity is not due to any disease and is simply a normal variant.  Having an elevated Alk Phos is more concerning and associated with many diseases and thus I would not be overly concerned with a low level which is seen in many health individuals. The reasons for a low serum alkaline phosphatase activity were reviewed in a 1-yr retrospective study and in this study it was found that no cause was found in most cases.  Low activity values were recorded in several individuals in the absence of any obvious cause. This would suggest that the definition of the lower limit of the reference range for alkaline phosphatase is arbitrary, thus limiting the use of low serum activity as a marker of disease.  In some cases micronutrients like Zinc (Zn) and Magnesium (Mg) are causes of low ALP activity and you can take zinc or magnesium supplements. Unfortunately, the blood work to measure zinc and magnesium levels are unreliable and not very accurate since it does not test for the intracellular zinc or magnesium levels.     Nellie SAUCEDOD, Madhu JH, Es MORAN. Clinical significance of a low serum alkaline phosphatase. Net J Med. 1992 Feb;40(1-2):9-14. PMID: [id number].  David JOHNSON, Guy DASILVA, Corinne I, Behera S, David JOHNSON. Low  Alkaline Phosphatase (ALP) In Adult Population an Indicator of Zinc (Zn) and Magnesium (Mg) Deficiency. Curr Res Nutr Food Sci 2017;5(3). doi : http://[email or web address]    Total Bilirubin: This is also part of liver function.    High T Bili: If you have right upper quadrant pain an elevation in total bilirubin can be caused by a gallbladder stone that is blocking the biliary tract that leads to your gastrointestinal tract. If you have fever and right upper quadrant pain this can sometimes be elevated when your gallbladder is infected and most individuals have nausea with vomiting associated with it. If you have no symptoms and are otherwise healthy this can be caused by Gilbert syndrome which is a benign normal variant. There are other causes such as some anemias but there would be abnormal blood counts.     Low T Bili: Nothing to be concerned about.     Thyroid Stimulating Hormone (TSH): This reading is an indicator of your thyroid function. The thyroid regulates energy levels throughout the entire body, affecting almost every organ system. This is an inverse relationship so a high number actually presents a low thyroid. If this is abnormal, we will often check an additional lab called a Free T4 to evaluate this more carefully. Borderline elevations, those of 5-10, can be watched or worked up further. Please do not take the supplement Biotin for at least a week prior to getting your TSH checked since this can lead to false measurement levels.     Prostate Specific Antigen (PSA): (Men only.) This is a prostate cancer screening test, and is no longer a routine screening test. Levels are truly a function of age. Being less than 4 is typical for someone more in their 60s. If you are young, it should probably be less than 3. A higher PSA result does not necessarily mean that you have cancer, but may indicate a need for a discussion with your provider. Options include observation to look at rate of rise  "or prostate biopsy. Not only is the absolute value important, but how much it has changed from previous years. Please ensure that there is not a dramatic rise from previous years.    Please Note: This information is included as a reference to help you better understand your lab results and is not be used for diagnosis.    Frequently asked questions    When should I take my blood pressure medication?    The latest studies show that taking your blood pressure medication at night is the best time. A recent study showed that this prevents more heart attacks and strokes. See the answer below from [place].     "Q. I've taken blood pressure medicines every morning for many years, and they keep my pressure under control. Recently, my doctor recommended taking them at bedtime, instead. Does that make sense?    A. It actually does make sense -- based on recent research. For many years, there have been at least three theoretical reasons for taking blood pressure medicines before bedtime. First, a body system that strongly affects blood pressure, called the renin-angiotensin system, has its peak activity during sleep. Second, circadian rhythms cause differences in the body chemistry at night compared with daytime. Third, most heart attacks occur in the morning, before medicines taken in the morning have a chance to "kick in."" [6].     When should I take my cholesterol medication?    It used to be recommended to take your cholesterol medication at night since the original statins that lowered cholesterol did not last 24 hours and most cholesterol synthesis is done at night. The long acting statins such as atorvastatin and rosuvastatin last 24 hours so they can be taken any time during the day. Simvastatin, pravastatin, fluvastatin, and lovastatin are shorter acting and should be taken at bed time.     Can supplements affect my blood work?    Yes they can. A very important supplement to not take for at least a week prior to your " "blood work is biotin. "Biotin supplement use is common and can lead to the false measurement of thyroid hormone in commonly used assays." [8.]    What are conditions that should not be addressed during a virtual visit?    There are some conditions that should not be evaluated via a virtual visit since optimal care is impossible. Chest pain, shortness of breath, lung conditions, abdominal pain, and any neurological complaint such as headache, dizziness, numbness ect.         When will I get commentary of my blood work?    I review all blood work that you get and I will send out commentary on this via [employer] within 72 hours. In most cases you will get a message from me sooner, but many times not all of the blood work is completed thus I usually wait until all results have returned. If there is a critical abnormality you should be contacted the same day you got blood work.     How frequently do I need to have visits to get controlled substances?    It is standard protocol to have a visit every 3 months if you are taking scheduled substances such as ADHD medications, psychiatric medications, and pain medications. This is to ensure your safety and monitor for any side effects.     When should I bring prior to a visit if I want to lose weight?    I recommend that you make a food diary for a week and fill out what you ate each day. You can bring this form in to your visit and I can look over it to suggest changes that you can make.     Which over the counter medications should I avoid if I have decreased kidney function?    NSAIDs which includes ibuprofen (Advil, Motrin, Nuprin), naproxen (Aleve), meloxicam (Mobic) and diclofenac(Zorvolex, Voltaren) and ketorolac (Toradol) can damage your kidneys if you take this long term.Tylenol does not affect your kidney and thus is safe as long as you don't have liver disease.     Is there an [facility]?     Yes! The [facility] is located on the first floor of the [facility] " hospitals. The address is listed below. You can get curbside pickup if you call their number at [phone]. One of the many benefits of using the [facility] pharmacy is that the pharmacists can contact me directly if a cheaper alternative is available to save you money. They also see your note to know more about what the medication was prescribed for. I recommend this pharmacy since communication with me is quick in case any confusion arises on your medications.     [address] [place] [address].  [address]  [place] [address]  Phone: [phone]    Hours:  Monday - Friday  8:00 a.m. - 5:30 a.m.    Why is it not in my best interest to call in order to get an antibiotic?    Medicine is a complex field and many times the correct diagnosis is critical in order to provide the correct care. One of the most important goals of a healthcare provider is to ensure that no dangerous condition is masquerading as a mild illness. Specific questions are very important to obtain during an examination that provide a wealth of information to understand your illness. Health care providers are trained to investigate for signs that can be dangerous to your health. Messaging or calling the office in order to get an antibiotic can be very dangerous.     For example, many urinary tract infections can lead to an infection in the kidney that can result in a serious blood stream infection that can lead to hospitalization if not recognized. A cough can be caused by many different things and not necessarily an infection. It is not uncommon that one assumes a cough is from an infection when it is actually caused by a blood clot in the lungs. This can lead to death. Determining your risk can only be performed after a thorough history and examination. A few sentences through e-mail is not enough.     What are some common symptoms that should be evaluated by the emergency department and not by phone or e-mail?    This does not include every symptom, but common  examples of symptoms that should prompt one to go to the emergency department are chest pain, chest pressure, shortness of breath, difficulty breathing, abdominal pain, weakness or numbness or an extremity, sudden weakness or drooping on one side of the body, speaking difficulty, unusual or bad headache (particularly if it started suddenly), head injury, confusion, seizure, passing out, lightheaded, pain in arm or jaw, suddenly not able to speak, see, walk, or move, dizziness, neck stiffness, suicidal thoughts, testicular pain, cuts and wounds, severe pain, along with many others. This is not an inclusive list.     Outside Records    It is common to have an colonoscopy, mammogram, PAP smear, or eye exam done outside the [facility] system. Many times we do not get the records automatically sent to us. Please call your provider's office to notify them to fax us your records so that we can have the most up to date information. Your provider will review your outside results in order to provide you with the complete care that you deserve. We appreciate that you decided to choose us to be serving on your healthcare team and the more information we have about your health is essential.     If I have a psychiatric crisis what should I do?    If you ever feel that there is a risk of a harm to yourself we recommend to go to the emergency room. There is a National Suicide Prevention lifeline number 0-511-708-TALK which route you to the nearest crisis center. There is also a suicide hotline 8-507-FJXRFBD (1-533.524.2267).    988 has been designated as the new three-digit dialing code that will route callers to the National Suicide Prevention Lifeline (now known as the Critical access hospital Suicide & Crisis Lifeline), and is now active across the [place].    When people call, text, or chat 938, they will be connected to trained counselors that are part of the existing Lifeline network. These trained counselors will listen, understand how their  problems are affecting them, provide support, and connect them to resources if necessary.    The previous Lifeline phone number (1-165.132.2170) will always remain available to people in emotional distress or suicidal crisis.    The [employer] network of over 200 crisis centers has been in operation since 2005, and has been proven to be effective. Its the counselors at these local crisis centers who answer the contacts the Lifeline receives every day. Numerous studies have shown that callers feel less suicidal, less depressed, less overwhelmed and more hopeful after speaking with a Lifeline counselor.     E-Visits    E-Visits are currently available for three conditions: Urinary tract infections, Sinus symptoms, and rashes.     If you have one of these infections or rash you can fill out a questionnaire that will be sent to your provider who can review and send an appropriate medication. No visit is needed.      What is an E-Visit?    An E-Visit is a way to get care for certain conditions without needing to schedule a virtual visit or to come into the clinic. We'll ask you some clinically based questions about yourself and your symptoms and your provider will evaluate, make a diagnosis and respond with a care plan with recommendations including testing and medications as indicated, just as they would in an in-person setting.  If it becomes clear that you need to be seen virtually or in-person after the E-Visit, we can arrange that.         Should I use an E-Visit?    E-Visits should be used only for non-urgent medical conditions. If you need urgent medical care, please contact your clinic by phone, schedule a same-day appointment online or find a nearby [facility]. For serious medical emergencies, please call 911 immediately.         What to expect during an E-Visit   Once you have agreed to an E-Visit, you will be prompted to complete the E-Visit clinical questionnaire in [email or web address], which can take 10-20  "minutes to complete. You may also be asked to confirm your medications.     Since this is a medical evaluation, it is billable to your insurance. Because this is a billable visit, you may also be asked for your insurance details and to enter your credit card information, just as you would for any other visit or co-pay. Much of this is stored in your account, so it should be easy to access or update if needed. You may receive a bill for this service, including any applicable copay amount, after the service is rendered.     Please allow up to 24 business hours for a reply. At any point in the E-Visit process, if you have not received a response within 72 hours, please call your clinic.        To initiate the E-Visit process in [facility], click here.       E-Consults    E-Consults are available when you need to have a specialists opinion on one thing and your PCP agrees to send an E-Consult to answer your question within 48 hours. This not only saves you time but money as well since a visit is not always needed.          Patient Education       Checking Your Blood Pressure at Home   The Basics   Written by the doctors and editors at [facility]   How is blood pressure measured? -- Blood pressure is usually measured with a device that goes around your upper arm. This is often done in a doctor's office. But some people also check their blood pressure themselves, at home or at work.  Blood pressure is explained with 2 numbers. For instance, your blood pressure might be "140 over 90." The first (top) number is the pressure inside your arteries when your heart is anton. The second (bottom) number is the pressure inside your arteries when your heart is relaxed. The table shows how doctors and nurses define high and normal blood pressure (table 1).  If your blood pressure gets too high, it puts you at risk for heart attack, stroke, and kidney disease. High blood pressure does not usually cause symptoms. But it can be " "serious.  What is a home blood pressure meter? -- A home blood pressure meter (or "monitor") is a device you can use to check your blood pressure yourself. It has a cuff that goes around your upper arm (figure 1). Some devices have a cuff that goes around your wrist instead. But doctors aren't sure if these work as well. The meter also has a small screen, or dial, that shows your blood pressure numbers.  There are also special meters you can wear for a day or 2. These are different because they automatically check your blood pressure throughout the day and night, even while you are sleeping. If your doctor thinks you should use one of these devices, they will talk to you about how to wear it.  Why do I need to check my blood pressure at home? -- If your doctor knows or suspects that you have high blood pressure, they might want you to check it at home. There are a few reasons for this. Your doctor might want to look at:  Whether your blood pressure measures the same at home as it did in the doctor's office  How well your blood pressure medicines are working  Changes in your blood pressure, for example, if it goes up and down  People who check their own blood pressure at home usually do better at keeping it low.  How do I choose a home blood pressure meter? -- When choosing a home blood pressure meter, you will probably want to think about:  Cost - Some devices cost more than others. You should also check to see if your insurance will help pay for your device.  Size - It's important to make sure the cuff fits your arm comfortably. Your doctor or nurse can help you with this.  How easy it is to use - You should make sure you understand how to use the device. You also need to be able to read the numbers on the screen.  You do not need a prescription to buy a home blood pressure meter. You can buy them at most pharmacies or over the internet. Your doctor or nurse can help you choose the right device for you.  How do I " check my blood pressure at home? -- Once you have a home blood pressure meter, your doctor or nurse should check it to make sure it fits you and works correctly.  When it's time to check your blood pressure:  Go to the bathroom and empty your bladder first. Having a full bladder can temporarily increase your blood pressure, making the results inaccurate.  Sit in a chair with your feet flat on the ground.  Try to breathe normally and stay calm.  Attach the cuff to your arm. Place the cuff directly on your skin, not over your clothing. The cuff should be tight enough to not slip down, but not uncomfortably tight.  Sit and relax for about 3 to 5 minutes with the cuff on.  Follow the directions that came with your device to start measuring your blood pressure. This might involve squeezing the bulb at the end of the tube to inflate the cuff (fill it with air). With some monitors, you just need to press a button to inflate the cuff. When the cuff fills with air, it feels like someone is squeezing your arm, but it should not hurt. Then you will slowly deflate the cuff (let the air out of it), or it will deflate by itself. The screen or dial will show your blood pressure numbers.  Stay seated and relax for 1 minute, then measure your blood pressure again.  How often should I check my blood pressure? -- It depends. Different people need to follow different schedules. Your doctor or nurse will tell you how often to check your blood pressure, and when. Some people need to check their blood pressure twice a day, in the morning and evening.  Your doctor or nurse will probably tell you to keep track of your blood pressure for at least a few days (table 2). Then they will look at the numbers. The reason for this is that it's normal for your blood pressure to change a bit from day to day. For example, the numbers might change depending on whether you recently had caffeine, just exercised, or feel stressed. Checking your blood  "pressure over several days - or longer - will give your doctor or nurse a better idea of what is average for you.  How should I keep track of my blood pressure? -- Some blood pressure meters will record your numbers for you, or send them to your computer or smartphone. If yours does not do this, you will need to write them down. Your doctor or nurse can help you figure out the best way to keep track of the numbers.  What if my blood pressure is high? -- Your doctor or nurse will tell you what to do if your blood pressure is high when you check it at home. If you get a number that is higher than normal, measure it again to see if it is still high. If it is very high (above a certain number, which your doctor or nurse will tell you to watch out for), you should call your doctor right away.  If your blood pressure is only a little high, your doctor or nurse might tell you to keep checking it for a few more days or weeks, and then call if it does not go back down. Then they can help you decide what to do next.  All topics are updated as new evidence becomes available and our peer review process is complete.  This topic retrieved from [employer] on: Sep 21, 2021.  Topic 016055 Version 4.0  Release: 29.4.2 - C29.263  © 2021 UpToDate, Inc. and/or its affiliates. All rights reserved.  table 1: Definition of normal and high blood pressure  Level  Top number  Bottom number    High 130 or above 80 or above   Elevated 120 to 129 79 or below   Normal 119 or below 79 or below   These definitions are from the American College of Cardiology/American Heart Association. Other expert groups might use slightly different definitions.  "Elevated blood pressure" is a term doctor or nurses use as a warning. It means you do not yet have high blood pressure, but your blood pressure is not as low as it should be for good health.  Graphic 46496 Version 6.0  figure 1: Using a home blood pressure meter     This is an example of a person using a " home blood pressure meter.  Graphic 745967 Version 1.0    Consumer Information Use and Disclaimer   This information is not specific medical advice and does not replace information you receive from your health care provider. This is only a brief summary of general information. It does NOT include all information about conditions, illnesses, injuries, tests, procedures, treatments, therapies, discharge instructions or life-style choices that may apply to you. You must talk with your health care provider for complete information about your health and treatment options. This information should not be used to decide whether or not to accept your health care provider's advice, instructions or recommendations. Only your health care provider has the knowledge and training to provide advice that is right for you. The use of this information is governed by the [employer] End User License Agreement, available at https://www.Cognovant/en/solutions/[employer]/about/danii.The use of [employer] content is governed by the [employer] Terms of Use. ©2021 UpToDate, Inc. All rights reserved.  Copyright   © 2021 UpToDate, Inc. and/or its affiliates. All rights reserved.      Daily Blood Pressure Log    Please print this form to assist you in keeping track of your blood pressure at home.      Name:  Date of Birth:       Average Blood Pressure:           Date: Time  (a.m.) Blood  Pressure: Pulse  Rate: Time  (p.m.) Blood  Pressure : Pulse  Rate: Comments:   Sample 8:37 127/83 84    Stressful morning                                                                                                                                                                                                     Wishing you good health,     Jose Sahu MD      References:  1-https://www.ShopSpot/speakers/gabby_vickis  2-https://www.ViewsIQ.com.au/news/nxopl-bsu-52-cancers-that-can-bk-qqmsog-rt-smoking/  3-https://www.cdc.gov/cancer/lung/basic_info/screening.htm  4-https://newsroom.heart.org/news/common-hypertension-medications-may-reduce-colorectal-cancer-risk  5-Jacintao A, Peg M, Edouardada N, et al. High hemoglobin A1c levels within the non-diabetic range are associated with the risk of all cancers. Int J Cancer. 2016;138(7):9803-9664. doi:10.1002/ijc.58055  6-https://www.health.[place].edu/newsletter_article/the-10-commandments-of-cancer-prevention  7-https://www.health.[place].edu/diseases-and-conditions/should-i-take-blood-pressure-medications-at-night  8-Daksha MCCLENDON et al 2018 Prevalence of biotin supplement usage in outpatients and plasma biotin concentrations in patients presenting to the emergency department. Clin Biochem. Epub 2018 Jul 20. PMID: [id number].

## 2023-05-15 ENCOUNTER — TELEPHONE (OUTPATIENT)
Dept: FAMILY MEDICINE | Facility: CLINIC | Age: 58
End: 2023-05-15
Payer: COMMERCIAL

## 2023-05-15 NOTE — TELEPHONE ENCOUNTER
----- Message from Jose Sahu MD sent at 5/15/2023  9:08 AM CDT -----  Regarding: xray results  Please let the patient know the x-ray showed arthritis along with some foraminal stenosis which can cause neck pain and we usually recommend inflammatories physical therapy and monitoring.  If the pain does not improve she can see a specialist for an injection.

## 2023-05-16 ENCOUNTER — TELEPHONE (OUTPATIENT)
Dept: FAMILY MEDICINE | Facility: CLINIC | Age: 58
End: 2023-05-16
Payer: COMMERCIAL

## 2023-05-16 NOTE — TELEPHONE ENCOUNTER
----- Message from Derek Reed sent at 5/16/2023  8:21 AM CDT -----  Contact: pt at [phone]  Type: Needs Medical Advice  Who Called:  pt  Best Call Back Number: [phone]  Additional Information: pt is calling the office to check the status of some paperwork that the dr was supposed to complete  on 5/11 and no one has called her.

## 2023-05-17 ENCOUNTER — TELEPHONE (OUTPATIENT)
Dept: FAMILY MEDICINE | Facility: CLINIC | Age: 58
End: 2023-05-17
Payer: COMMERCIAL

## 2023-05-18 ENCOUNTER — TELEPHONE (OUTPATIENT)
Dept: FAMILY MEDICINE | Facility: CLINIC | Age: 58
End: 2023-05-18
Payer: COMMERCIAL

## 2023-05-23 ENCOUNTER — TELEPHONE (OUTPATIENT)
Dept: FAMILY MEDICINE | Facility: CLINIC | Age: 58
End: 2023-05-23
Payer: COMMERCIAL

## 2023-05-23 NOTE — TELEPHONE ENCOUNTER
----- Message from Demi Adamson sent at 5/23/2023  9:53 AM CDT -----  Regarding: NEEDS RETURN CALL  Type: Needs Medical Advice  Who Called:  Randell    Melchor Call Back Number: [phone]     Additional Information: pt called regarding her xray and ct wanted to get the results please cassia.

## 2023-06-01 ENCOUNTER — TELEPHONE (OUTPATIENT)
Dept: FAMILY MEDICINE | Facility: CLINIC | Age: 58
End: 2023-06-01
Payer: COMMERCIAL

## 2023-06-01 NOTE — TELEPHONE ENCOUNTER
----- Message from Jennifer Ash sent at 6/1/2023  8:56 AM CDT -----  Contact: ALLAN LEAL [phone]  Type: Needs Medical Advice    Who Called:  ALLAN LEAL     Best Call Back Number: [phone]       Additional Information: Patient is calling to speak with nurse/MA regarding Release to Return to Work.   Please call back and advise. Thanks

## 2023-06-01 NOTE — TELEPHONE ENCOUNTER
Called and spoke with pt. Informed pt of letter written and awaiting pickup. Pt verbalized understanding.
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
## 2023-08-18 ENCOUNTER — OFFICE VISIT (OUTPATIENT)
Dept: FAMILY MEDICINE | Facility: CLINIC | Age: 58
End: 2023-08-18
Payer: COMMERCIAL

## 2023-08-18 VITALS
HEART RATE: 78 BPM | TEMPERATURE: 98 F | BODY MASS INDEX: 27.06 KG/M2 | HEIGHT: 62 IN | DIASTOLIC BLOOD PRESSURE: 68 MMHG | SYSTOLIC BLOOD PRESSURE: 108 MMHG | WEIGHT: 147.06 LBS | OXYGEN SATURATION: 98 %

## 2023-08-18 DIAGNOSIS — L23.9 ALLERGIC CONTACT DERMATITIS, UNSPECIFIED TRIGGER: Primary | ICD-10-CM

## 2023-08-18 PROCEDURE — 99213 PR OFFICE/OUTPT VISIT, EST, LEVL III, 20-29 MIN: ICD-10-PCS | Mod: S$GLB,,, | Performed by: NURSE PRACTITIONER

## 2023-08-18 PROCEDURE — 99213 OFFICE O/P EST LOW 20 MIN: CPT | Mod: S$GLB,,, | Performed by: NURSE PRACTITIONER

## 2023-08-18 RX ORDER — TIZANIDINE 2 MG/1
2 TABLET ORAL 3 TIMES DAILY
COMMUNITY
Start: 2023-08-16

## 2023-08-18 RX ORDER — CELECOXIB 200 MG/1
200 CAPSULE ORAL 2 TIMES DAILY PRN
COMMUNITY
Start: 2023-05-29
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
## 2023-08-18 NOTE — PROGRESS NOTES
This dictation has been generated using Modal Fluency Dictation some phonetic errors may occur. Please contact author for clarification if needed.     Problem List Items Addressed This Visit    None  Visit Diagnoses       Allergic contact dermatitis, unspecified trigger    -  Primary                 Contact derm. RTW    Follow up if symptoms worsen or fail to improve.    ________________________________________________________________  ________________________________________________________________      Chief Complaint   Patient presents with    Rash     History of present illness  This 57 y.o. presents today for complaint of rash. Last week and improved. Needs note to go back to work.  Patient had gone to [place] and worked on her property about a week ago.  She has a rash on the hands and arms.  She does have a couple new areas.  No fever chills malaise.  No cough cold symptoms.    Past Medical History:   Diagnosis Date    Fatty liver        Past Surgical History:   Procedure Laterality Date    BELT ABDOMINOPLASTY       SECTION      x2    ENDOMETRIAL ABLATION      HYSTERECTOMY      TUBAL LIGATION         Family History   Problem Relation Age of Onset    Breast cancer Cousin     Cancer Father         prostate    Diabetes Mother     Heart disease Mother     Breast cancer Maternal Aunt     Ovarian cancer Neg Hx     Melanoma Neg Hx        Social History     Socioeconomic History    Marital status:    Tobacco Use    Smoking status: Never     Passive exposure: Never    Smokeless tobacco: Never   Substance and Sexual Activity    Alcohol use: Yes     Alcohol/week: 0.0 standard drinks of alcohol     Comment: socially    Drug use: No    Sexual activity: Yes     Partners: Male     Birth control/protection: Surgical     Comment: ablation / tubal     Social Determinants of Health     Stress: No Stress Concern Present (10/14/2020)    [place] [employer] - Occupational Stress Questionnaire      Feeling of Stress : Only a little       Current Outpatient Medications   Medication Sig Dispense Refill    celecoxib (CELEBREX) 200 MG capsule Take 200 mg by mouth 2 (two) times daily as needed.      meloxicam (MOBIC) 15 MG tablet Take 1 tablet (15 mg total) by mouth daily as needed for Pain. 30 tablet 3    tiZANidine (ZANAFLEX) 2 MG tablet Take 2 mg by mouth 3 (three) times daily.       No current facility-administered medications for this visit.       Review of patient's allergies indicates:  No Known Allergies    Physical examination  Vitals Reviewed\  Vitals:    08/18/23 1047   BP: 108/68   Pulse: 78   Temp: 98.2 °F (36.8 °C)     Body mass index is 26.9 kg/m².  Gen. Well-dressed well-nourished   Skin warm dry and intact.  Erythematous rash.  No blistering noted.  Present on hands.  There is a linear area on the lower leg that is a rash with redness.  Chest.  Respirations are even unlabored.  Lungs are clear to auscultation.  Cardiac regular rate and rhythm.  No chest wall adenopathy noted.  Neuro. Awake alert oriented x4.  Normal judgment and cognition noted.  Extremities no clubbing cyanosis or edema noted.     Call or return to clinic prn if these symptoms worsen or fail to improve as anticipated.

## 2023-08-23 ENCOUNTER — PATIENT MESSAGE (OUTPATIENT)
Dept: FAMILY MEDICINE | Facility: CLINIC | Age: 58
End: 2023-08-23
Payer: COMMERCIAL

## 2023-10-31 ENCOUNTER — OFFICE VISIT (OUTPATIENT)
Dept: FAMILY MEDICINE | Facility: CLINIC | Age: 58
End: 2023-10-31
Payer: COMMERCIAL

## 2023-10-31 ENCOUNTER — LAB VISIT (OUTPATIENT)
Dept: LAB | Facility: HOSPITAL | Age: 58
End: 2023-10-31
Attending: STUDENT IN AN ORGANIZED HEALTH CARE EDUCATION/TRAINING PROGRAM
Payer: COMMERCIAL

## 2023-10-31 VITALS
HEIGHT: 62 IN | RESPIRATION RATE: 16 BRPM | BODY MASS INDEX: 27.18 KG/M2 | SYSTOLIC BLOOD PRESSURE: 120 MMHG | TEMPERATURE: 98 F | HEART RATE: 83 BPM | DIASTOLIC BLOOD PRESSURE: 80 MMHG | WEIGHT: 147.69 LBS | OXYGEN SATURATION: 99 %

## 2023-10-31 DIAGNOSIS — Z00.00 HEALTHCARE MAINTENANCE: ICD-10-CM

## 2023-10-31 DIAGNOSIS — R53.83 FATIGUE, UNSPECIFIED TYPE: ICD-10-CM

## 2023-10-31 DIAGNOSIS — Z00.00 ENCOUNTER FOR PREVENTIVE HEALTH EXAMINATION: Primary | ICD-10-CM

## 2023-10-31 DIAGNOSIS — R10.9 ABDOMINAL PAIN, UNSPECIFIED ABDOMINAL LOCATION: ICD-10-CM

## 2023-10-31 DIAGNOSIS — R19.7 DIARRHEA, UNSPECIFIED TYPE: ICD-10-CM

## 2023-10-31 DIAGNOSIS — K59.00 CONSTIPATION, UNSPECIFIED CONSTIPATION TYPE: ICD-10-CM

## 2023-10-31 LAB
ALBUMIN SERPL BCP-MCNC: 3.6 G/DL (ref 3.5–5.2)
ALP SERPL-CCNC: 99 U/L (ref 55–135)
ALT SERPL W/O P-5'-P-CCNC: 28 U/L (ref 10–44)
ANION GAP SERPL CALC-SCNC: 8 MMOL/L (ref 8–16)
AST SERPL-CCNC: 30 U/L (ref 10–40)
BACTERIA #/AREA URNS AUTO: NORMAL /HPF
BASOPHILS # BLD AUTO: 0.06 K/UL (ref 0–0.2)
BASOPHILS NFR BLD: 0.9 % (ref 0–1.9)
BILIRUB SERPL-MCNC: 0.4 MG/DL (ref 0.1–1)
BILIRUBIN, UA POC OHS: NEGATIVE
BLOOD, UA POC OHS: NEGATIVE
BUN SERPL-MCNC: 10 MG/DL (ref 6–20)
CALCIUM SERPL-MCNC: 9.6 MG/DL (ref 8.7–10.5)
CHLORIDE SERPL-SCNC: 107 MMOL/L (ref 95–110)
CHOLEST SERPL-MCNC: 170 MG/DL (ref 120–199)
CHOLEST/HDLC SERPL: 3.9 {RATIO} (ref 2–5)
CLARITY, UA POC OHS: CLEAR
CO2 SERPL-SCNC: 26 MMOL/L (ref 23–29)
COLOR, UA POC OHS: YELLOW
CREAT SERPL-MCNC: 0.7 MG/DL (ref 0.5–1.4)
DIFFERENTIAL METHOD: ABNORMAL
EOSINOPHIL # BLD AUTO: 0.2 K/UL (ref 0–0.5)
EOSINOPHIL NFR BLD: 3 % (ref 0–8)
ERYTHROCYTE [DISTWIDTH] IN BLOOD BY AUTOMATED COUNT: 12.2 % (ref 11.5–14.5)
ERYTHROCYTE [SEDIMENTATION RATE] IN BLOOD BY WESTERGREN METHOD: 42 MM/HR (ref 0–20)
EST. GFR  (NO RACE VARIABLE): >60 ML/MIN/1.73 M^2
ESTIMATED AVG GLUCOSE: 105 MG/DL (ref 68–131)
FERRITIN SERPL-MCNC: 357 NG/ML (ref 20–300)
FOLATE SERPL-MCNC: 4.5 NG/ML (ref 4–24)
GLUCOSE SERPL-MCNC: 62 MG/DL (ref 70–110)
GLUCOSE, UA POC OHS: NEGATIVE
HBA1C MFR BLD: 5.3 % (ref 4–5.6)
HCT VFR BLD AUTO: 41.3 % (ref 37–48.5)
HDLC SERPL-MCNC: 44 MG/DL (ref 40–75)
HDLC SERPL: 25.9 % (ref 20–50)
HGB BLD-MCNC: 13.8 G/DL (ref 12–16)
IMM GRANULOCYTES # BLD AUTO: 0.01 K/UL (ref 0–0.04)
IMM GRANULOCYTES NFR BLD AUTO: 0.1 % (ref 0–0.5)
IRON SERPL-MCNC: 116 UG/DL (ref 30–160)
KETONES, UA POC OHS: NEGATIVE
LDLC SERPL CALC-MCNC: 95.6 MG/DL (ref 63–159)
LEUKOCYTES, UA POC OHS: ABNORMAL
LIPASE SERPL-CCNC: 56 U/L (ref 4–60)
LYMPHOCYTES # BLD AUTO: 3.3 K/UL (ref 1–4.8)
LYMPHOCYTES NFR BLD: 48.6 % (ref 18–48)
MCH RBC QN AUTO: 30.7 PG (ref 27–31)
MCHC RBC AUTO-ENTMCNC: 33.4 G/DL (ref 32–36)
MCV RBC AUTO: 92 FL (ref 82–98)
MICROSCOPIC COMMENT: NORMAL
MONOCYTES # BLD AUTO: 0.5 K/UL (ref 0.3–1)
MONOCYTES NFR BLD: 7.5 % (ref 4–15)
NEUTROPHILS # BLD AUTO: 2.7 K/UL (ref 1.8–7.7)
NEUTROPHILS NFR BLD: 39.9 % (ref 38–73)
NITRITE, UA POC OHS: NEGATIVE
NONHDLC SERPL-MCNC: 126 MG/DL
NRBC BLD-RTO: 0 /100 WBC
PH, UA POC OHS: 6
PLATELET # BLD AUTO: 291 K/UL (ref 150–450)
PMV BLD AUTO: 11.7 FL (ref 9.2–12.9)
POTASSIUM SERPL-SCNC: 3.6 MMOL/L (ref 3.5–5.1)
PROT SERPL-MCNC: 7.6 G/DL (ref 6–8.4)
PROTEIN, UA POC OHS: NEGATIVE
RBC # BLD AUTO: 4.49 M/UL (ref 4–5.4)
RBC #/AREA URNS AUTO: 0 /HPF (ref 0–4)
SATURATED IRON: 39 % (ref 20–50)
SODIUM SERPL-SCNC: 141 MMOL/L (ref 136–145)
SPECIFIC GRAVITY, UA POC OHS: 1.02
SQUAMOUS #/AREA URNS AUTO: 1 /HPF
TOTAL IRON BINDING CAPACITY: 297 UG/DL (ref 250–450)
TRANSFERRIN SERPL-MCNC: 201 MG/DL (ref 200–375)
TRIGL SERPL-MCNC: 152 MG/DL (ref 30–150)
TSH SERPL DL<=0.005 MIU/L-ACNC: 1.43 UIU/ML (ref 0.4–4)
UROBILINOGEN, UA POC OHS: 0.2
WBC # BLD AUTO: 6.69 K/UL (ref 3.9–12.7)
WBC #/AREA URNS AUTO: 4 /HPF (ref 0–5)

## 2023-10-31 PROCEDURE — 82607 VITAMIN B-12: CPT | Performed by: STUDENT IN AN ORGANIZED HEALTH CARE EDUCATION/TRAINING PROGRAM

## 2023-10-31 PROCEDURE — 3074F PR MOST RECENT SYSTOLIC BLOOD PRESSURE < 130 MM HG: ICD-10-PCS | Mod: CPTII,S$GLB,, | Performed by: STUDENT IN AN ORGANIZED HEALTH CARE EDUCATION/TRAINING PROGRAM

## 2023-10-31 PROCEDURE — 99999 PR PBB SHADOW E&M-EST. PATIENT-LVL IV: CPT | Mod: PBBFAC,,, | Performed by: STUDENT IN AN ORGANIZED HEALTH CARE EDUCATION/TRAINING PROGRAM

## 2023-10-31 PROCEDURE — 83036 HEMOGLOBIN GLYCOSYLATED A1C: CPT | Performed by: STUDENT IN AN ORGANIZED HEALTH CARE EDUCATION/TRAINING PROGRAM

## 2023-10-31 PROCEDURE — 83690 ASSAY OF LIPASE: CPT | Performed by: STUDENT IN AN ORGANIZED HEALTH CARE EDUCATION/TRAINING PROGRAM

## 2023-10-31 PROCEDURE — 3079F PR MOST RECENT DIASTOLIC BLOOD PRESSURE 80-89 MM HG: ICD-10-PCS | Mod: CPTII,S$GLB,, | Performed by: STUDENT IN AN ORGANIZED HEALTH CARE EDUCATION/TRAINING PROGRAM

## 2023-10-31 PROCEDURE — 3074F SYST BP LT 130 MM HG: CPT | Mod: CPTII,S$GLB,, | Performed by: STUDENT IN AN ORGANIZED HEALTH CARE EDUCATION/TRAINING PROGRAM

## 2023-10-31 PROCEDURE — 80061 LIPID PANEL: CPT | Performed by: STUDENT IN AN ORGANIZED HEALTH CARE EDUCATION/TRAINING PROGRAM

## 2023-10-31 PROCEDURE — 81001 URINALYSIS AUTO W/SCOPE: CPT | Performed by: STUDENT IN AN ORGANIZED HEALTH CARE EDUCATION/TRAINING PROGRAM

## 2023-10-31 PROCEDURE — 81003 POCT URINALYSIS(INSTRUMENT): ICD-10-PCS | Mod: QW,S$GLB,, | Performed by: STUDENT IN AN ORGANIZED HEALTH CARE EDUCATION/TRAINING PROGRAM

## 2023-10-31 PROCEDURE — 3008F BODY MASS INDEX DOCD: CPT | Mod: CPTII,S$GLB,, | Performed by: STUDENT IN AN ORGANIZED HEALTH CARE EDUCATION/TRAINING PROGRAM

## 2023-10-31 PROCEDURE — 82728 ASSAY OF FERRITIN: CPT | Performed by: STUDENT IN AN ORGANIZED HEALTH CARE EDUCATION/TRAINING PROGRAM

## 2023-10-31 PROCEDURE — 1159F PR MEDICATION LIST DOCUMENTED IN MEDICAL RECORD: ICD-10-PCS | Mod: CPTII,S$GLB,, | Performed by: STUDENT IN AN ORGANIZED HEALTH CARE EDUCATION/TRAINING PROGRAM

## 2023-10-31 PROCEDURE — 80053 COMPREHEN METABOLIC PANEL: CPT | Performed by: STUDENT IN AN ORGANIZED HEALTH CARE EDUCATION/TRAINING PROGRAM

## 2023-10-31 PROCEDURE — 86677 HELICOBACTER PYLORI ANTIBODY: CPT | Performed by: STUDENT IN AN ORGANIZED HEALTH CARE EDUCATION/TRAINING PROGRAM

## 2023-10-31 PROCEDURE — 82746 ASSAY OF FOLIC ACID SERUM: CPT | Performed by: STUDENT IN AN ORGANIZED HEALTH CARE EDUCATION/TRAINING PROGRAM

## 2023-10-31 PROCEDURE — 99396 PREV VISIT EST AGE 40-64: CPT | Mod: 25,S$GLB,, | Performed by: STUDENT IN AN ORGANIZED HEALTH CARE EDUCATION/TRAINING PROGRAM

## 2023-10-31 PROCEDURE — 84466 ASSAY OF TRANSFERRIN: CPT | Performed by: STUDENT IN AN ORGANIZED HEALTH CARE EDUCATION/TRAINING PROGRAM

## 2023-10-31 PROCEDURE — 81003 URINALYSIS AUTO W/O SCOPE: CPT | Mod: QW,S$GLB,, | Performed by: STUDENT IN AN ORGANIZED HEALTH CARE EDUCATION/TRAINING PROGRAM

## 2023-10-31 PROCEDURE — 85651 RBC SED RATE NONAUTOMATED: CPT | Mod: PO | Performed by: STUDENT IN AN ORGANIZED HEALTH CARE EDUCATION/TRAINING PROGRAM

## 2023-10-31 PROCEDURE — 3079F DIAST BP 80-89 MM HG: CPT | Mod: CPTII,S$GLB,, | Performed by: STUDENT IN AN ORGANIZED HEALTH CARE EDUCATION/TRAINING PROGRAM

## 2023-10-31 PROCEDURE — 85025 COMPLETE CBC W/AUTO DIFF WBC: CPT | Performed by: STUDENT IN AN ORGANIZED HEALTH CARE EDUCATION/TRAINING PROGRAM

## 2023-10-31 PROCEDURE — 83540 ASSAY OF IRON: CPT | Performed by: STUDENT IN AN ORGANIZED HEALTH CARE EDUCATION/TRAINING PROGRAM

## 2023-10-31 PROCEDURE — 84443 ASSAY THYROID STIM HORMONE: CPT | Performed by: STUDENT IN AN ORGANIZED HEALTH CARE EDUCATION/TRAINING PROGRAM

## 2023-10-31 PROCEDURE — 1159F MED LIST DOCD IN RCRD: CPT | Mod: CPTII,S$GLB,, | Performed by: STUDENT IN AN ORGANIZED HEALTH CARE EDUCATION/TRAINING PROGRAM

## 2023-10-31 PROCEDURE — 99396 PR PREVENTIVE VISIT,EST,40-64: ICD-10-PCS | Mod: 25,S$GLB,, | Performed by: STUDENT IN AN ORGANIZED HEALTH CARE EDUCATION/TRAINING PROGRAM

## 2023-10-31 PROCEDURE — 99999 PR PBB SHADOW E&M-EST. PATIENT-LVL IV: ICD-10-PCS | Mod: PBBFAC,,, | Performed by: STUDENT IN AN ORGANIZED HEALTH CARE EDUCATION/TRAINING PROGRAM

## 2023-10-31 PROCEDURE — 86364 TISS TRNSGLTMNASE EA IG CLAS: CPT | Performed by: STUDENT IN AN ORGANIZED HEALTH CARE EDUCATION/TRAINING PROGRAM

## 2023-10-31 PROCEDURE — 3008F PR BODY MASS INDEX (BMI) DOCUMENTED: ICD-10-PCS | Mod: CPTII,S$GLB,, | Performed by: STUDENT IN AN ORGANIZED HEALTH CARE EDUCATION/TRAINING PROGRAM

## 2023-10-31 NOTE — PROGRESS NOTES
[facility] Note    Subjective:    The HPI and pertinent ROS is included in the Diagnostic Impression Remarks section at the end of the note. Please see below for further details. Chief complaint is at end of note.     Randell is a pleasant intelligent patient who is here for evaluation.     Modified Medications    No medications on file       Data reviewed 274}  Previous medical records reviewed and summarized in plan section at end of note.      If you are due for any health screening(s) below please notify me so we can arrange them to be ordered and scheduled. Most healthy patients at your age complete them, but you are free to accept or refuse. If you can't do it, I'll definitely understand. If you can, I'd certainly appreciate it!     Tests to Keep You Healthy    Mammogram: ORDERED BUT NOT SCHEDULED  Colon Cancer Screening: ORDERED      The following portions of the patient's history were reviewed and updated as appropriate: allergies, current medications, past family history, past medical history, past social history, past surgical history and problem list.    She  has a past medical history of Fatty liver.  She  has a past surgical history that includes Endometrial ablation;  section; Tubal ligation; Belt abdominoplasty; and Hysterectomy.    She  reports that she has never smoked. She has never been exposed to tobacco smoke. She has never used smokeless tobacco. She reports current alcohol use. She reports that she does not use drugs.  She family history includes Breast cancer in her cousin and maternal aunt; Cancer in her father; Diabetes in her mother; Heart disease in her mother.    Review of patient's allergies indicates:  No Known Allergies    Tobacco Use: Low Risk  (10/31/2023)    Patient History     Smoking Tobacco Use: Never     Smokeless Tobacco Use: Never     Passive Exposure: Never     Physical Examination  General appearance: alert, cooperative, no distress  Neck: no  "thyromegaly, no neck stiffness  Lungs: clear to auscultation, no wheezes, rales or rhonchi, symmetric air entry  Heart: normal rate, regular rhythm, normal S1, S2, no murmurs, rubs, clicks or gallops  Abdomen: soft, suprapublic tenderness, nondistended, no rigidity, rebound, or guarding. No tenderness at McBurney's point. Neg psoas and obturator sign. Neg earl sign.    Back: no point tenderness over spine  Extremities: peripheral pulses normal, no unilateral leg swelling or calf tenderness   Neurological:alert, oriented, normal speech, no new focal findings or movement disorder noted from baseline    BP Readings from Last 3 Encounters:   10/31/23 120/80   08/18/23 108/68   05/11/23 120/70     Wt Readings from Last 3 Encounters:   10/31/23 67 kg (147 lb 11.3 oz)   08/18/23 66.7 kg (147 lb 0.8 oz)   05/11/23 65 kg (143 lb 4.8 oz)     /80 (BP Location: Right arm, Patient Position: Sitting, BP Method: Large (Manual))   Pulse 83   Temp 98.1 °F (36.7 °C) (Oral)   Resp 16   Ht 5' 2" (1.575 m)   Wt 67 kg (147 lb 11.3 oz)   SpO2 99%   BMI 27.02 kg/m²    274}  Laboratory: I have reviewed old labs below:    274}    Lab Results   Component Value Date    WBC 6.39 10/21/2020    HGB 14.8 10/21/2020    HCT 45.4 10/21/2020    MCV 93 10/21/2020     10/21/2020     10/21/2020    K 3.6 10/21/2020     10/21/2020    CALCIUM 9.7 10/21/2020    CO2 23 10/21/2020     10/21/2020    BUN 12 10/21/2020    CREATININE 0.8 10/21/2020    ANIONGAP 12 10/21/2020    PROT 7.6 10/21/2020    ALBUMIN 3.6 10/21/2020    BILITOT 0.9 10/21/2020    ALKPHOS 113 10/21/2020    ALT 45 (H) 10/21/2020    AST 32 10/21/2020    CHOL 169 10/21/2020    TRIG 141 10/21/2020    HDL 44 10/21/2020    LDLCALC 96.8 10/21/2020    TSH 1.324 12/08/2015    HGBA1C 5.1 10/21/2020      Lab reviewed by me: Particular labs of significance that I will monitor, workup, or treat to improve are mentioned below in diagnostic impression " "remarks.    Imaging/EKG: I have reviewed the pertinent results and my findings are noted in remarks.  274}    CC:   Chief Complaint   Patient presents with    Abdominal Pain        274}    Assessment/Plan  Sara Ferro is a 57 y.o. female who presents to clinic with:  1. Encounter for preventive health examination    2. Healthcare maintenance    3. Abdominal pain, unspecified abdominal location    4. Diarrhea, unspecified type    5. Fatigue, unspecified type    6. Constipation, unspecified constipation type       274}  Diagnostic Impression Remarks + HPI     Documentation entered by me for this encounter may have been done in part using speech-recognition technology. Although I have made an effort to ensure accuracy, "sound like" errors may exist and should be interpreted in context.     Abdominal pain-patient reports that she is some suprapubic pain for the past few months intermittent no dysuria no frequency no flank pain no fever chills does has some intermittent diarrhea and constipation no blood or black in stool no nausea vomiting no heavy alcohol use no right upper quadrant pain no heartburn.  Patient wants a sometimes food makes it worse.  Will get urine rule out UTI recommend that she get colon cancer screening will put in referral for GI recommended very important will also check blood work as well check inflammatory markers as well patient had a normal obturator sign am psoas sign I do not suspect she has appendicitis at this time if her urine test is negative will get a CT imaging  Diarrhea-patient has intermittent diarrhea will rule out celiac disease check thyroid other blood work going to see GI as well   Fatigue-unclear etiology will follow up on blood work  Constipation- Patient has a history of constipation recommend colon cancer screening will check TSH and other blood work   preventive-will get blood work and follow-up on this do recommend she get colon cancer screening and " mammogram    History and exam favor low risk due to her reported symptoms, physical exam, and vital signs. At this point in time the patient is considered a low risk as determined by her history, physical, and the absence of red flags. I do not suspect a surgical abdomen since her abdomen was soft with no guarding, rebound, or heel tap sign. I have a low suspicion for appendicitis since she has no RLQ tenderness, psoas sign, obturator sign or fever. No CVA tenderness. Cholecystitis is low on my differential since I did not appreciate RUQ tenderness or a earl sign. No radiation to the RUQ. A SBO is unlikely since she has flatus, normal bowel sounds, soft abdomen, and bowel movements. Aortic dissection is low on the differential since she denies a ripping or tearing sensation chest pain, chest pain that radiates to the back, and sudden severe abdominal pain. On my exam there was no pulse deficit or pulsatile abdominal mass. Mesenteric ischemia is low on my differential since the patient did not have pain out of proportion on abdominal exam or pain after meals. I do not believe the patient has a life threatening condition this was discussed this with her.     I explained the risks and benefits of symptomatic treatment and share decision making was made. She verbalized understanding and chose symptomatic treatment with close monitoring. Therefore, we decided to monitor for improvement and then proceed with close follow up if necessary. She was instructed that if her symptoms persists or worsen to immediately go to the ED and she agreed to this plan.    This is the extent of this pleasant patient's concerns at this present time. She did not feel chest pain upon exertion, dyspnea, nausea, vomiting, diaphoresis, or syncope. No pleuritic chest pain, unilateral leg swelling, calf tenderness, or calf pain. Negative for unintentional weight loss night sweats, hematuria, and fevers. Sara will return to clinic in a few  months for further workup and reassessment or sooner as needed. She was instructed to call the clinic or go to the emergency department or urgent care immediately if her symptoms do not improve, worsens, or if any new symptoms develop. As we discussed that symptoms could worsen over the next 24 hours she was advised that if any increased swelling, pain, or numbness arise to go immediately to the ED. Patient knows to call any time if an emergency arises. Shared decision making occurred and she verbalized understanding in agreement with this plan. I discussed imaging findings, diagnosis, possibilities, treatment options, medications, risks, and benefits. She had many questions regarding the options and long-term effects. All questions were answered. She expressed understanding after counseling regarding the diagnosis and recommendations. She was capable and demonstrated competence with understanding of these options. Shared decision making was performed resulting in her choosing the current treatment plan. Patient handout was given with instructions and recommendations. Advised the patient that if they become pregnant to alert us immediately to assess for medication changes. Having a healthy weight can decrease the risk of 13 cancers and is an important goal. I also discussed the importance of close follow up to discuss labs, change or modify her medications if needed, monitor side effects, and further evaluation of medical problems.     Additional workup planned: see labs ordered below.    See below for labs and meds ordered with associated diagnosis      1. Healthcare maintenance    2. Abdominal pain, unspecified abdominal location  - CBC Auto Differential; Future  - Comprehensive Metabolic Panel; Future  - Hemoglobin A1C; Future  - Lipid Panel; Future  - TSH; Future  - POCT Urinalysis(Instrument); Future  - Urinalysis Microscopic  - Sedimentation rate; Future  - Tissue Transglutaminase, IgA; Future  - H. pylori  Antibody, IgG; Future  - LIPASE; Future    3. Diarrhea, unspecified type  - Sedimentation rate; Future  - Tissue Transglutaminase, IgA; Future  - Ambulatory referral/consult to Gastroenterology; Future    4. Fatigue, unspecified type  - Vitamin B12 Deficiency Panel; Future  - Folate; Future  - Iron and TIBC; Future  - Ferritin; Future    5. Encounter for preventive health examination    6. Constipation, unspecified constipation type      Jose Sahu MD   274}    If you are due for any health screening(s) below please notify me so we can arrange them to be ordered and scheduled. Most healthy patients at your age complete them, but you are free to accept or refuse.     If you can't do it, I'll definitely understand. If you can, I'd certainly appreciate it!   Tests to Keep You Healthy    Mammogram: ORDERED BUT NOT SCHEDULED  Colon Cancer Screening: ORDERED           Calm

## 2023-10-31 NOTE — PATIENT INSTRUCTIONS
Pravin Ruiz, Please read below to learn more on healthy habits.  Most of my patients read it.     If you are due for any health screening(s) below please notify me so we can arrange them to be ordered and scheduled. Most healthy patients at your age complete them, but you are free to accept or refuse.     If you can't do it, I'll definitely understand. If you can, I'd certainly appreciate it!     Tests to Keep You Healthy    Mammogram: ORDERED BUT NOT SCHEDULED  Colon Cancer Screening: ORDERED      Schedule your breast cancer screening today     Breast cancer is the second most common cancer in women,  and the second leading cause of death from cancer. Mammograms can detect breast cancer early, which significantly increases the chances of curing the cancer.       Our records indicate that you may be overdue for breast cancer screening. Cancer screenings save lives, so schedule yours today to stay healthy.     If you recently had a mammogram performed outside of [facility], please let your Health care team know so that they can update your health record.        Its time for your colon cancer screening     Colorectal cancer is one of the leading causes of cancer death for men and women but it doesnt have to be. Screenings can prevent colorectal cancer or find it early enough to treat and cure the disease.     Our records indicate that you may be overdue for colon cancer screening. A colonoscopy or stool screening test can help identify patients at risk for developing colon cancer. Cancer screenings save lives, so schedule yours today to stay healthy.     A colonoscopy is the preferred test for detecting colon cancer. It is needed only once every 10 years if results are negative. While you are sedated, a flexible, lighted tube with a tiny camera is inserted into the rectum and advanced through the colon to look for cancers.     An alternative screening test that is used at home and returned to the lab may  also be used. It detects hidden blood in bowel movements which could indicate cancer in the colon. If results are positive, you will need a colonoscopy to determine if the blood is a sign of cancer. This type of follow up (diagnostic) colonoscopy usually requires additional copays as required by your insurance provider.     If you recently had your colon cancer screening performed outside of [facility], please let your Health care team know so that they can update your health record. Please contact your PCP if you have any questions.       Table of Contents:     Cancer prevention  Explanation on the different components of your blood work and interpretation  Frequently asked questions   Blood pressure log   E-Visits  E-Consults     Cancer Prevention    Why should you choose to get screened for cancer? One simple reason is because you are important. You matter and deserve to have the best health so you can fulfill your great potential.     Colon Cancer screening - Most colon cancers can be prevented by screening. In most cases a polyp in the colon can grow and is not cancerous at first, but it can become cancerous years later. A polyp is like a seed that can grow into a weed if it is left in the soil. If the seed is detected and removed, no weed sprouts. A FIT test/Cologuard test and colonoscopy can detect precancerous polyps and lead to the prevention of cancer. A polyp is just like a seed that can be removed before the roots take hold. A colonoscopy can remove these polyps and eliminate the chance that these polyps turn into cancer.     Cervical Cancer screening- A PAP smear can detect precancerous cells that can become cervical cancer and can lead to procedures to remove them. It is very important to get a PAP smear as investing these few minutes can help prevent a lot of trouble down the road. If you want to do the most you can do to spend more time with your family and friends', cancer screenings can  "help with that goal.     Breast Cancer screening- Mammograms can detect breast cancer before it has spread and early detection allows the ability to remove the lesion prior to any spread. It is similar to a small rotten spot on fruit. If the spoiled part is removed quickly it can preserve the rest of the fruit, but if it is left alone it will corrupt the whole peach.     Smoking Cessation- "Smoking is like a chimney. The tar builds up and causes a back draft which leads to a cough. Smoking is like sitting in a car with a blocked exhaust system." Smoking can increase your risk for lung, mouth, throat, nose, esophagus, bladder, kidney, ureter, pancreas, stomach, liver, cervix, ovary, bowel, and leukemia [2]. If you have smoked in the past, you might meet the criteria for a CT lung cancer screening.     Lung Cancer Screening - "The U.S. Preventive Services Task Force (USPSTF) recommendsexternal icon yearly lung cancer screening with LDCT for people who--have a 20 pack-year or more smoking history, and smoke now or have quit within the past 15 years, and are between 50 and 80 years old. A pack-year is smoking an average of one pack of cigarettes per day for one year. For example, a person could have a 20 pack-year history by smoking one pack a day for 20 years or two packs a day for 10 years." [3]    Hypertension - Common high blood pressure meds may lower colorectal cancer risk-[place], July 6, 2020 - Hypertension Journal Report Medications commonly prescribed to treat high blood pressure may also reduce patients' colorectal cancer risk, according to new research published today in Hypertension, an American Heart Association journal." [4].     Low HbA1c - "Higher HbA1c levels (within both the non?diabetic and diabetic ranges) were associated with the risk of colorectal cancer (Model 2; P linear?=?0.009), especially for colon cancer." [5].    A healthy diet, exercise, limitation of alcohol use, certain infections, " avoidance of radiation/environmental toxins, and staying lean lowers your cancer risk [6].     I want to empower you to make informed choices for your health. I have a listed below the most common blood components that we check for when you get blood work. I discuss what each component measures along with common reasons for why they can be abnormal. If you are interested in understanding your blood work better, please read the lab explanation attached below.     Explanation of Lab Results    Please note: This information is included as a reference to help you better understand your lab results and is not be used for diagnosis.     Lipid Panel:  Cholesterol is a measure of cardiac risk and stroke risk. A lipid panel measures total cholesterol and provides readings which are broken down into 3 subsections: Triglycerides, HDL and LDL.    Total Cholesterol: Your total blood cholesterol is a measure of LDL cholesterol, HDL cholesterol, and other lipid components.  If your individual lipid level components are in the normal range and you have an elevated total cholesterol level we are generally not to concerned since we primarily look at the LDL cholesterol which is considered the bad cholesterol which can lead to heart attacks and strokes.  Your calculated cardiac risk is the most important factor in deciding if you would benefit from a cholesterol-lowering medication that the total cholesterol level.    Triglycerides are most diet and weight sensitive. They are affected easily by lifestyle changes. Ideally, this reading should be less than 150, although if the remainder of the cholesterol panel is within normal limits, we will tolerate upwards of 250-300. For the most part, if triglycerides are the only part of your cholesterol panel reading as 'abnormal', it is best to lose weight and modify your diet, including less saturated fat and less simple sugars. We only start medication to lower this is the level is greater  than 500 since this can increase ones risk for pancreatitis. This is not the cholesterol type that leads to heart attacks.     HDL is your 'good cholesterol.' Ideally, the reading should be over 40 and is particularly helpful when it is greater than 60. Research indicates that high HDL is extremely protective; and if your HDL level is greater than 60, we tolerate far worse LDL or triglyceride levels. For the most part, HDL is responsive to aerobic activity and ideal weight. We do not have medicines that increase the HDL reading very easily.    LDL is your 'bad cholesterol.' Our goals depend on how many cardiac risk factors you have.     High LDL: If you are diabetic or have had a heart attack, we like the LDL level to be less than 100. If you have 2 cardiac risk factors (such as diabetes, hypertension, family history, a low HDL and smoking), we like your LDL to be less than 130. If you have 0-2 cardiac risk factors, we like your LDL level to be less than 160, but we may not necessarily initiate medications to 190 unless you cannot lower it. The latest guidelines recommend to consider taking a statin only if your ASCVD cardiac risk score is at least greater than 7.5% and a strong recommendation if your risk score is greater than 10%.     Low LDL: Normal or low LDL is considered good and having an LDL below the normal range is not of a concern.     Complete Blood Count (CBC):    White blood cells: These are infection fighting cells. Mild fluctuations may represent minor illness at the time of blood draw. Marked fluctuations can represent immune diseases. This can also be elevated from smoking.     High WBC: Elevation in wbc can commonly be caused by an infection, steroid use, or any cause of inflammation such as many rheumatologic diseases, surgery, or trauma.      Low WBC: Many different things can cause this such as infections, medications, rheumatologic disorders, malignancies, nutritional deficiencies, and a  normal variant in some individuals. If this occurs it is common practice to rule out a few viruses such as HIV, Hep C, B12, folate along with a a peripheral blood smear to look for abnormalities. If you are otherwise healthy with no concerning symptoms and the workup is negative we usually monitor it with yearly blood work.  If there are other abnormal cell line abnormalities sometimes we refer to hematology to further evaluate.    Hemoglobin: A key indicator for anemia. Ranges depend on age. If you are significantly out of this range, we may need to talk with you.    High Hemoglobin: This can be elevated in some blood disorders, sleep apnea, chronic lung disease, kidney disease, testosterone use, dehydration, smoking, along with some other rare causes.     Low Hemoglobin: Many things can lead to this but the most common cause is an iron deficiency in females who lose blood during their periods, decreased absorption due to antacids, poor diet, sickle cell, anemia of chronic disease, malignancy, bleeding from the gut, along with some other less common causes. If you are a young female who has periods it is usually assumed that this is from that blood loss unless other symptoms or abnormal blood work is present. If you are above 45 and have an iron deficiency it is always recommended to get a colonoscopy to ensure that there is no lesion causing blood loss and to exclude malignancy.     Hematocrit: Another way of looking at anemia, much like your hemoglobin. If you are significantly out of this range, we may need to talk with you.    MCV: This looks at the size of your red blood cells.     High MCV:  A large number may indicate a B-12 deficiency, folate deficiency, alcohol use, liver disease, medication-induced phenomenon, or a need for further workup.    Low MCV: A small number may imply iron anemia or genetic disorder such as alpha-thalassemia. We may check iron studies called to see if you are low.    MCH: Much  like MCV above.    Platelets: These are clotting factors. We tolerate a broad range in this. If your numbers are less than 100, we may need to work it up.     High Platelet Count: If your numbers are elevated, this could represent ongoing inflammation within the body which can be caused by any infection, illness, iron deficiency, or other malignancy. We usually recheck it to monitor for improvement and if it does not resolve will work it up with more blood work.     Low Platelet Count: Many things can cause this such as infections, alcohol use, medications, liver disease, vitamin deficiencies, aspleenia, and some other rare blood disorders. If it persists many times we refer to hematology if a cause is not identified.     Iron:  This is not a reliable blood marker since this fluctuates throughout the day and if you are fasting many times your iron level in your blood is low but this does not necessarily mean you have a deficiency.  There are more reliable ways to measure your iron stores and these are discussed below.    Transferrin:  Many things can cause an abnormal result and this is not as important as some other labs mentioned below.    TIBC:  This is the total iron-binding capacity and this measures the total amount of iron that can be bound by proteins in the blood.  If you have an elevated TIBC this is a good marker that you could be low on iron and this is useful blood marker.  A simple way to think of this is seats in a car. The TIBC is the number of open seats that iron can sit in. If you have a high TIBC (number of open seats) that means you have a low iron level.     Ferritin:  A low ferritin is almost always caused from an iron deficiency.  A normal ferritin level does not need necessarily exclude an iron deficiency since many inflammatory conditions such as kidney disease, diabetes, arthritis, and obesity can raise this level hiding an iron deficiency.  If you are healthy with no inflammatory  conditions this is a very reliable test for detecting an iron deficiency since nothing else lowers your ferritin level except a low iron level. Keep in mind that you can have an iron deficiency if your ferritin level is normal but your TIBC is elevated.  If you have a low iron level the next step is always to identify why you have a low iron level.    CMP (Comprehensive Metabolic Panel):  Broadly, this is a test of organ function including the kidneys, liver, and electrolyte levels.    Glucose: This is primarily looking for diabetes. We like your blood glucose level to be less than 100 when fasting. Readings of 100-125 indicate what we call 'pre-diabetes' or 'glucose intolerance.' This does not necessarily indicate diabetes, but we may check another test called a hemoglobin A1C for confirmation. This level puts you at great risk for becoming a true diabetic and we would encourage the reduction of simple sugars and processed white flour as well as appropriate weight loss. If this number is greater than 125, it is likely you are diabetic; we will get an additional hemoglobin A1C test and will likely schedule you for an appointment. If you notice that your blood glucose is above 125 and we have not scheduled a follow-up appointment, please call us. Patients who are known diabetics can have readings greater than 125.    Urea Nitrogen: This is a kidney function and maybe elevated because of mild dehydration or because of excessive muscle breakdown from aggressive exercise habits.    Creatinine: This also is a kidney function test. It may be mildly elevated if you have particularly large muscle bulk or taking a supplement like creatine. It is related to the GFR. It is a muscle product that we track to look at your kidney function. If this is elevated and new, we may need to talk with you. If you have had this mildly elevated in the past, it is likely that we will just track it to ensure that it does not worsen quickly.  Some medications may gently worsen this, namely blood pressure pills called ace inhibitors. To some degree, we will permit levels of up to 1.6.    Sodium: This is essentially the concentration of salt within the body. This may be mildly low because of dehydration, overhydration, diuretics, .    Potassium: This is an electrolyte that can cause muscular cramping or cardiac difficulties. It is sometimes lowered by diuretic medications.    Chloride: For the most part, this is only relevant if the other electrolytes are abnormal.    CO2: This is a function of acid balance within the body. For the most part, mild abnormalities are not important and may represent a starvation or dehydration state when blood was drawn. Many medications can change this level as well such as diuretics, acetazolamide, calcium carbonate, laxatives, aspirin, and many others.    High CO2: Many things can cause this which includes dehydration, sleep apnea, and COPD.     Low CO2: Many things can lead to a low level and if you are generally healthy we are usually  not concerned. Diarrhea, renal disease, diabetes, and many medications can cause this.     Anion Gap: Only relevant if your CO2 is abnormal.    Calcium: This is not related to dietary intake of calcium. It may fluctuate gently based on the amount of protein within your body. If it is above 10.9, we may need to do additional testing. Many times if low the albumin level is low and once the corrected calcium level is calculated the calcium is in a normal range.     Total protein: This looks at protein within the body. Markedly elevated levels can represent an immune response and may require further workup.    Albumin: This may be elevated because of particularly high level of fitness. If it is markedly suppressed, it may represent organ dysfunction, particularly in the liver or kidneys.    AST and ALT: These are enzymes in the liver and if elevated can indicate liver damage.     Elevated  AST/ALT: Many things can caused elevated liver enzymes and the most common reason is viral infections, alcohol use, medications, supplements, autoimmune, along with some other rare causes. One of the most common reasons for a mild elevation in liver enzymes (less than 3 times the upper limit) is fatty liver disease. Many individuals who have extra fat in their diet store this in the liver and this can build up and cause a mild elevation. This is usually diagnosed with a liver ultrasound and exclusion of other causes. The only treatment for this is diet and exercise along with avoidance of liver toxic medications and alcohol. It is standard of care to rule our viral hepatitis and get imaging of the liver if elevated. This is monitored and if I feel concerned will refer to hepatology.     Alk Phos: Part of liver function or bones    High Alk Phos: elevated levels may indicate a liver injury or obstruction of bile flow. Elevated levels can also be seen in vitamin D deficiency, drug induced, or bone disorders.     Low Alk Phos: In most cases having a low Alkaline Phosphatase enzyme activity is not due to any disease and is simply a normal variant.  Having an elevated Alk Phos is more concerning and associated with many diseases and thus I would not be overly concerned with a low level which is seen in many health individuals. The reasons for a low serum alkaline phosphatase activity were reviewed in a 1-yr retrospective study and in this study it was found that no cause was found in most cases.  Low activity values were recorded in several individuals in the absence of any obvious cause. This would suggest that the definition of the lower limit of the reference range for alkaline phosphatase is arbitrary, thus limiting the use of low serum activity as a marker of disease.  In some cases micronutrients like Zinc (Zn) and Magnesium (Mg) are causes of low ALP activity and you can take zinc or magnesium supplements.  Unfortunately, the blood work to measure zinc and magnesium levels are unreliable and not very accurate since it does not test for the intracellular zinc or magnesium levels.     Nellie MORELOS, Madhu BRAVO, Es MORAN. Clinical significance of a low serum alkaline phosphatase. [facility] J Med. 1992 Feb;40(1-2):9-14. PMID: [id number].  David ESCOBAR S, Guy B, Corinne I, Behera S, David S. Low Alkaline Phosphatase (ALP) In Adult Population an Indicator of Zinc (Zn) and Magnesium (Mg) Deficiency. Curr Res Nutr Food Sci 2017;5(3). doi : [email or web address]://[email or web address]    Total Bilirubin: This is also part of liver function.    High T Bili: If you have right upper quadrant pain an elevation in total bilirubin can be caused by a gallbladder stone that is blocking the biliary tract that leads to your gastrointestinal tract. If you have fever and right upper quadrant pain this can sometimes be elevated when your gallbladder is infected and most individuals have nausea with vomiting associated with it. If you have no symptoms and are otherwise healthy this can be caused by Gilbert syndrome which is a benign normal variant. There are other causes such as some anemias but there would be abnormal blood counts.     Low T Bili: Nothing to be concerned about.     Thyroid Stimulating Hormone (TSH): This reading is an indicator of your thyroid function. The thyroid regulates energy levels throughout the entire body, affecting almost every organ system. This is an inverse relationship so a high number actually presents a low thyroid. If this is abnormal, we will often check an additional lab called a Free T4 to evaluate this more carefully. Borderline elevations, those of 5-10, can be watched or worked up further. Please do not take the supplement Biotin for at least a week prior to getting your TSH checked since this can lead to false measurement levels.     Prostate Specific Antigen (PSA): (Men only.) This is a prostate cancer screening  "test, and is no longer a routine screening test. Levels are truly a function of age. Being less than 4 is typical for someone more in their 60s. If you are young, it should probably be less than 3. A higher PSA result does not necessarily mean that you have cancer, but may indicate a need for a discussion with your provider. Options include observation to look at rate of rise or prostate biopsy. Not only is the absolute value important, but how much it has changed from previous years. Please ensure that there is not a dramatic rise from previous years.    Please Note: This information is included as a reference to help you better understand your lab results and is not be used for diagnosis.    Frequently asked questions    When should I take my blood pressure medication?    The latest studies show that taking your blood pressure medication at night is the best time. A recent study showed that this prevents more heart attacks and strokes. See the answer below from [employer].     "Q. I've taken blood pressure medicines every morning for many years, and they keep my pressure under control. Recently, my doctor recommended taking them at bedtime, instead. Does that make sense?    A. It actually does make sense -- based on recent research. For many years, there have been at least three theoretical reasons for taking blood pressure medicines before bedtime. First, a body system that strongly affects blood pressure, called the renin-angiotensin system, has its peak activity during sleep. Second, circadian rhythms cause differences in the body chemistry at night compared with daytime. Third, most heart attacks occur in the morning, before medicines taken in the morning have a chance to "kick in."" [6].     When should I take my cholesterol medication?    It used to be recommended to take your cholesterol medication at night since the original statins that lowered cholesterol did not last 24 hours and most cholesterol synthesis " "is done at night. The long acting statins such as atorvastatin and rosuvastatin last 24 hours so they can be taken any time during the day. Simvastatin, pravastatin, fluvastatin, and lovastatin are shorter acting and should be taken at bed time.     Can supplements affect my blood work?    Yes they can. A very important supplement to not take for at least a week prior to your blood work is biotin. "Biotin supplement use is common and can lead to the false measurement of thyroid hormone in commonly used assays." [8.]    What are conditions that should not be addressed during a virtual visit?    There are some conditions that should not be evaluated via a virtual visit since optimal care is impossible. Chest pain, shortness of breath, lung conditions, abdominal pain, and any neurological complaint such as headache, dizziness, numbness ect.         When will I get commentary of my blood work?    I review all blood work that you get and I will send out commentary on this via [employer] within 72 hours. In most cases you will get a message from me sooner, but many times not all of the blood work is completed thus I usually wait until all results have returned. If there is a critical abnormality you should be contacted the same day you got blood work.     How frequently do I need to have visits to get controlled substances?    It is standard protocol to have a visit every 3 months if you are taking scheduled substances such as ADHD medications, psychiatric medications, and pain medications. This is to ensure your safety and monitor for any side effects.     When should I bring prior to a visit if I want to lose weight?    I recommend that you make a food diary for a week and fill out what you ate each day. You can bring this form in to your visit and I can look over it to suggest changes that you can make.     Which over the counter medications should I avoid if I have decreased kidney function?    NSAIDs which includes " ibuprofen (Advil, Motrin, Nuprin), naproxen (Aleve), meloxicam (Mobic) and diclofenac(Zorvolex, Voltaren) and ketorolac (Toradol) can damage your kidneys if you take this long term.Tylenol does not affect your kidney and thus is safe as long as you don't have liver disease.     Is there an [facility]?     Yes! The [facility] is located on the first floor of the [facility]. The address is listed below. You can get curbside pickup if you call their number at [phone]. One of the many benefits of using the [facility] pharmacy is that the pharmacists can contact me directly if a cheaper alternative is available to save you money. They also see your note to know more about what the medication was prescribed for. I recommend this pharmacy since communication with me is quick in case any confusion arises on your medications.     [address] [facility].  [address] [place] [id number]  Phone: [phone]    Hours:  Monday - Friday  8:00 a.m. - 5:30 a.m.    Why is it not in my best interest to call in order to get an antibiotic?    Medicine is a complex field and many times the correct diagnosis is critical in order to provide the correct care. One of the most important goals of a healthcare provider is to ensure that no dangerous condition is masquerading as a mild illness. Specific questions are very important to obtain during an examination that provide a wealth of information to understand your illness. Health care providers are trained to investigate for signs that can be dangerous to your health. Messaging or calling the office in order to get an antibiotic can be very dangerous.     For example, many urinary tract infections can lead to an infection in the kidney that can result in a serious blood stream infection that can lead to hospitalization if not recognized. A cough can be caused by many different things and not necessarily an infection. It is not uncommon that one assumes a cough is from an infection  when it is actually caused by a blood clot in the lungs. This can lead to death. Determining your risk can only be performed after a thorough history and examination. A few sentences through e-mail is not enough.     What are some common symptoms that should be evaluated by the emergency department and not by phone or e-mail?    This does not include every symptom, but common examples of symptoms that should prompt one to go to the emergency department are chest pain, chest pressure, shortness of breath, difficulty breathing, abdominal pain, weakness or numbness or an extremity, sudden weakness or drooping on one side of the body, speaking difficulty, unusual or bad headache (particularly if it started suddenly), head injury, confusion, seizure, passing out, lightheaded, pain in arm or jaw, suddenly not able to speak, see, walk, or move, dizziness, neck stiffness, suicidal thoughts, testicular pain, cuts and wounds, severe pain, along with many others. This is not an inclusive list.     Outside Records    It is common to have an colonoscopy, mammogram, PAP smear, or eye exam done outside the [facility] system. Many times we do not get the records automatically sent to us. Please call your provider's office to notify them to fax us your records so that we can have the most up to date information. Your provider will review your outside results in order to provide you with the complete care that you deserve. We appreciate that you decided to choose us to be serving on your healthcare team and the more information we have about your health is essential.     If I have a psychiatric crisis what should I do?    If you ever feel that there is a risk of a harm to yourself we recommend to go to the emergency room. There is a National Suicide Prevention lifeline number 0-326-219-TALK which route you to the nearest crisis center. There is also a suicide hotline 5-218-UXSWWPH (1-564.881.5136).    988 has been designated as the new  three-digit dialing code that will route callers to the National Suicide Prevention Lifeline (now known as the 988 Suicide & Crisis Lifeline), and is now active across the [place].    When people call, text, or chat 988, they will be connected to trained counselors that are part of the existing Lifeline network. These trained counselors will listen, understand how their problems are affecting them, provide support, and connect them to resources if necessary.    The previous Lifeline phone number (1-799.677.8823) will always remain available to people in emotional distress or suicidal crisis.    The [employer] network of over 200 crisis centers has been in operation since 2005, and has been proven to be effective. Its the counselors at these local crisis centers who answer the contacts the Lifeline receives every day. Numerous studies have shown that callers feel less suicidal, less depressed, less overwhelmed and more hopeful after speaking with a Lifeline counselor.     E-Visits    E-Visits are currently available for three conditions: Urinary tract infections, Sinus symptoms, and rashes.     If you have one of these infections or rash you can fill out a questionnaire that will be sent to your provider who can review and send an appropriate medication. No visit is needed.      What is an E-Visit?    An E-Visit is a way to get care for certain conditions without needing to schedule a virtual visit or to come into the clinic. We'll ask you some clinically based questions about yourself and your symptoms and your provider will evaluate, make a diagnosis and respond with a care plan with recommendations including testing and medications as indicated, just as they would in an in-person setting.  If it becomes clear that you need to be seen virtually or in-person after the E-Visit, we can arrange that.         Should I use an E-Visit?    E-Visits should be used only for non-urgent medical conditions. If you need  "urgent medical care, please contact your clinic by phone, schedule a same-day appointment online or find a nearby [facility]. For serious medical emergencies, please call 911 immediately.         What to expect during an E-Visit   Once you have agreed to an E-Visit, you will be prompted to complete the E-Visit clinical questionnaire in [email or web address], which can take 10-20 minutes to complete. You may also be asked to confirm your medications.     Since this is a medical evaluation, it is billable to your insurance. Because this is a billable visit, you may also be asked for your insurance details and to enter your credit card information, just as you would for any other visit or co-pay. Much of this is stored in your account, so it should be easy to access or update if needed. You may receive a bill for this service, including any applicable copay amount, after the service is rendered.     Please allow up to 24 business hours for a reply. At any point in the E-Visit process, if you have not received a response within 72 hours, please call your clinic.        To initiate the E-Visit process in [facility], click here.       E-Consults    E-Consults are available when you need to have a specialists opinion on one thing and your PCP agrees to send an E-Consult to answer your question within 48 hours. This not only saves you time but money as well since a visit is not always needed.          Patient Education       Checking Your Blood Pressure at Home   The Basics   Written by the doctors and editors at [facility]   How is blood pressure measured? -- Blood pressure is usually measured with a device that goes around your upper arm. This is often done in a doctor's office. But some people also check their blood pressure themselves, at home or at work.  Blood pressure is explained with 2 numbers. For instance, your blood pressure might be "140 over 90." The first (top) number is the pressure inside your arteries when your " "heart is anton. The second (bottom) number is the pressure inside your arteries when your heart is relaxed. The table shows how doctors and nurses define high and normal blood pressure (table 1).  If your blood pressure gets too high, it puts you at risk for heart attack, stroke, and kidney disease. High blood pressure does not usually cause symptoms. But it can be serious.  What is a home blood pressure meter? -- A home blood pressure meter (or "monitor") is a device you can use to check your blood pressure yourself. It has a cuff that goes around your upper arm (figure 1). Some devices have a cuff that goes around your wrist instead. But doctors aren't sure if these work as well. The meter also has a small screen, or dial, that shows your blood pressure numbers.  There are also special meters you can wear for a day or 2. These are different because they automatically check your blood pressure throughout the day and night, even while you are sleeping. If your doctor thinks you should use one of these devices, they will talk to you about how to wear it.  Why do I need to check my blood pressure at home? -- If your doctor knows or suspects that you have high blood pressure, they might want you to check it at home. There are a few reasons for this. Your doctor might want to look at:  Whether your blood pressure measures the same at home as it did in the doctor's office  How well your blood pressure medicines are working  Changes in your blood pressure, for example, if it goes up and down  People who check their own blood pressure at home usually do better at keeping it low.  How do I choose a home blood pressure meter? -- When choosing a home blood pressure meter, you will probably want to think about:  Cost - Some devices cost more than others. You should also check to see if your insurance will help pay for your device.  Size - It's important to make sure the cuff fits your arm comfortably. Your doctor or " nurse can help you with this.  How easy it is to use - You should make sure you understand how to use the device. You also need to be able to read the numbers on the screen.  You do not need a prescription to buy a home blood pressure meter. You can buy them at most pharmacies or over the internet. Your doctor or nurse can help you choose the right device for you.  How do I check my blood pressure at home? -- Once you have a home blood pressure meter, your doctor or nurse should check it to make sure it fits you and works correctly.  When it's time to check your blood pressure:  Go to the bathroom and empty your bladder first. Having a full bladder can temporarily increase your blood pressure, making the results inaccurate.  Sit in a chair with your feet flat on the ground.  Try to breathe normally and stay calm.  Attach the cuff to your arm. Place the cuff directly on your skin, not over your clothing. The cuff should be tight enough to not slip down, but not uncomfortably tight.  Sit and relax for about 3 to 5 minutes with the cuff on.  Follow the directions that came with your device to start measuring your blood pressure. This might involve squeezing the bulb at the end of the tube to inflate the cuff (fill it with air). With some monitors, you just need to press a button to inflate the cuff. When the cuff fills with air, it feels like someone is squeezing your arm, but it should not hurt. Then you will slowly deflate the cuff (let the air out of it), or it will deflate by itself. The screen or dial will show your blood pressure numbers.  Stay seated and relax for 1 minute, then measure your blood pressure again.  How often should I check my blood pressure? -- It depends. Different people need to follow different schedules. Your doctor or nurse will tell you how often to check your blood pressure, and when. Some people need to check their blood pressure twice a day, in the morning and evening.  Your doctor or  nurse will probably tell you to keep track of your blood pressure for at least a few days (table 2). Then they will look at the numbers. The reason for this is that it's normal for your blood pressure to change a bit from day to day. For example, the numbers might change depending on whether you recently had caffeine, just exercised, or feel stressed. Checking your blood pressure over several days - or longer - will give your doctor or nurse a better idea of what is average for you.  How should I keep track of my blood pressure? -- Some blood pressure meters will record your numbers for you, or send them to your computer or smartphone. If yours does not do this, you will need to write them down. Your doctor or nurse can help you figure out the best way to keep track of the numbers.  What if my blood pressure is high? -- Your doctor or nurse will tell you what to do if your blood pressure is high when you check it at home. If you get a number that is higher than normal, measure it again to see if it is still high. If it is very high (above a certain number, which your doctor or nurse will tell you to watch out for), you should call your doctor right away.  If your blood pressure is only a little high, your doctor or nurse might tell you to keep checking it for a few more days or weeks, and then call if it does not go back down. Then they can help you decide what to do next.  All topics are updated as new evidence becomes available and our peer review process is complete.  This topic retrieved from [employer] on: Sep 21, 2021.  Topic 811891 Version 4.0  Release: 29.4.2 - C29.263  © 2021 UpToDate, Inc. and/or its affiliates. All rights reserved.  table 1: Definition of normal and high blood pressure  Level  Top number  Bottom number    High 130 or above 80 or above   Elevated 120 to 129 79 or below   Normal 119 or below 79 or below   These definitions are from the American College of Cardiology/American Heart  "Association. Other expert groups might use slightly different definitions.  "Elevated blood pressure" is a term doctor or nurses use as a warning. It means you do not yet have high blood pressure, but your blood pressure is not as low as it should be for good health.  Graphic 78851 Version 6.0  figure 1: Using a home blood pressure meter     This is an example of a person using a home blood pressure meter.  Graphic 669647 Version 1.0    Consumer Information Use and Disclaimer   This information is not specific medical advice and does not replace information you receive from your health care provider. This is only a brief summary of general information. It does NOT include all information about conditions, illnesses, injuries, tests, procedures, treatments, therapies, discharge instructions or life-style choices that may apply to you. You must talk with your health care provider for complete information about your health and treatment options. This information should not be used to decide whether or not to accept your health care provider's advice, instructions or recommendations. Only your health care provider has the knowledge and training to provide advice that is right for you. The use of this information is governed by the [employer] End User License Agreement, available at https://www.[employer]/en/solutions/[employer]/about/danii.The use of [employer] content is governed by the [employer] Terms of Use. ©2021 UpToDate, Inc. All rights reserved.  Copyright   © 2021 UpToDate, Inc. and/or its affiliates. All rights reserved.      Daily Blood Pressure Log    Please print this form to assist you in keeping track of your blood pressure at home.      Name:  Date of Birth:       Average Blood Pressure:           Date: Time  (a.m.) Blood  Pressure: Pulse  Rate: Time  (p.m.) Blood  Pressure : Pulse  Rate: Comments:   Sample 8:37 127/83 84    Stressful morning                                                                  "                                                                                                                                    Wishing you good health,     Jose Sahu MD     References:  1-https://www.[employer]/speakers/gabby_vickis  2-https://www.[employer]me.com.au/news/pxgrq-yum-13-cancers-that-can-sf-sppqkh-ys-smoking/  3-https://www.cdc.gov/cancer/lung/basic_info/screening.htm  4-https://newsroom.heart.org/news/common-hypertension-medications-may-reduce-colorectal-cancer-risk  5-Jacintao A, Peg M, Sawada N, et al. High hemoglobin A1c levels within the non-diabetic range are associated with the risk of all cancers. Int J Cancer. 2016;138(7):6462-8786. doi:10.1002/ijc.40737  6-https://www.health.[place].edu/newsletter_article/the-10-commandments-of-cancer-prevention  7-https://www.health.[place].edu/diseases-and-conditions/should-i-take-blood-pressure-medications-at-night  8-Daksha MCCLENDON et al 2018 Prevalence of biotin supplement usage in outpatients and plasma biotin concentrations in patients presenting to the emergency department. Clin Biochem. Epub 2018 Jul 20. PMID: 39141139.

## 2023-11-01 ENCOUNTER — PATIENT MESSAGE (OUTPATIENT)
Dept: FAMILY MEDICINE | Facility: CLINIC | Age: 58
End: 2023-11-01
Payer: COMMERCIAL

## 2023-11-01 ENCOUNTER — TELEPHONE (OUTPATIENT)
Dept: FAMILY MEDICINE | Facility: CLINIC | Age: 58
End: 2023-11-01
Payer: COMMERCIAL

## 2023-11-01 DIAGNOSIS — R10.9 ABDOMINAL PAIN, UNSPECIFIED ABDOMINAL LOCATION: Primary | ICD-10-CM

## 2023-11-01 LAB — H PYLORI IGG SERPL QL IA: NEGATIVE

## 2023-11-01 NOTE — TELEPHONE ENCOUNTER
----- Message from Zoya Palmer sent at 11/1/2023  9:14 AM CDT -----  Regarding: results  Contact: pt  Type:  Test Results    Who Called: pt  Name of Test (Lab/Mammo/Etc): Non fasting, urine  Date of Test: 10/31/2023  Ordering Provider: Luis Alberto  Where the test was performed: [facility]  Would the patient rather a call back or a response via [facility]? Call back  Best Call Back Number: [phone]    Additional Information:  sts she would like to get her results--please advise

## 2023-11-02 ENCOUNTER — TELEPHONE (OUTPATIENT)
Dept: FAMILY MEDICINE | Facility: CLINIC | Age: 58
End: 2023-11-02
Payer: COMMERCIAL

## 2023-11-02 LAB — VIT B12 SERPL-MCNC: 411 NG/L (ref 180–914)

## 2023-11-02 NOTE — TELEPHONE ENCOUNTER
----- Message from Gaby Wilkinson sent at 11/2/2023  8:39 AM CDT -----  Contact: Self  Type: Needs Medical Advice  Who Called:  Pt     Best Call Back Number: [phone]    Additional Information: Regarding the CT scan

## 2023-11-06 LAB — TTG IGA SER-ACNC: 0.7 U/ML

## 2023-11-07 ENCOUNTER — OFFICE VISIT (OUTPATIENT)
Dept: GASTROENTEROLOGY | Facility: CLINIC | Age: 58
End: 2023-11-07
Payer: COMMERCIAL

## 2023-11-07 VITALS
HEART RATE: 82 BPM | WEIGHT: 148.56 LBS | DIASTOLIC BLOOD PRESSURE: 72 MMHG | SYSTOLIC BLOOD PRESSURE: 112 MMHG | BODY MASS INDEX: 27.18 KG/M2

## 2023-11-07 DIAGNOSIS — Z87.898 HISTORY OF ABDOMINAL PAIN: ICD-10-CM

## 2023-11-07 DIAGNOSIS — R19.4 CHANGE IN BOWEL HABITS: ICD-10-CM

## 2023-11-07 DIAGNOSIS — Z87.898 HISTORY OF DIARRHEA: ICD-10-CM

## 2023-11-07 DIAGNOSIS — K59.00 CONSTIPATION, UNSPECIFIED CONSTIPATION TYPE: Primary | ICD-10-CM

## 2023-11-07 DIAGNOSIS — K76.0 FATTY LIVER: ICD-10-CM

## 2023-11-07 PROCEDURE — 99203 OFFICE O/P NEW LOW 30 MIN: CPT | Mod: S$GLB,,,

## 2023-11-07 PROCEDURE — 1160F RVW MEDS BY RX/DR IN RCRD: CPT | Mod: CPTII,S$GLB,,

## 2023-11-07 PROCEDURE — 3008F BODY MASS INDEX DOCD: CPT | Mod: CPTII,S$GLB,,

## 2023-11-07 PROCEDURE — 1159F PR MEDICATION LIST DOCUMENTED IN MEDICAL RECORD: ICD-10-PCS | Mod: CPTII,S$GLB,,

## 2023-11-07 PROCEDURE — 3078F DIAST BP <80 MM HG: CPT | Mod: CPTII,S$GLB,,

## 2023-11-07 PROCEDURE — 3008F PR BODY MASS INDEX (BMI) DOCUMENTED: ICD-10-PCS | Mod: CPTII,S$GLB,,

## 2023-11-07 PROCEDURE — 1160F PR REVIEW ALL MEDS BY PRESCRIBER/CLIN PHARMACIST DOCUMENTED: ICD-10-PCS | Mod: CPTII,S$GLB,,

## 2023-11-07 PROCEDURE — 3044F PR MOST RECENT HEMOGLOBIN A1C LEVEL <7.0%: ICD-10-PCS | Mod: CPTII,S$GLB,,

## 2023-11-07 PROCEDURE — 99999 PR PBB SHADOW E&M-EST. PATIENT-LVL IV: CPT | Mod: PBBFAC,,,

## 2023-11-07 PROCEDURE — 3044F HG A1C LEVEL LT 7.0%: CPT | Mod: CPTII,S$GLB,,

## 2023-11-07 PROCEDURE — 3074F PR MOST RECENT SYSTOLIC BLOOD PRESSURE < 130 MM HG: ICD-10-PCS | Mod: CPTII,S$GLB,,

## 2023-11-07 PROCEDURE — 3074F SYST BP LT 130 MM HG: CPT | Mod: CPTII,S$GLB,,

## 2023-11-07 PROCEDURE — 3078F PR MOST RECENT DIASTOLIC BLOOD PRESSURE < 80 MM HG: ICD-10-PCS | Mod: CPTII,S$GLB,,

## 2023-11-07 PROCEDURE — 1159F MED LIST DOCD IN RCRD: CPT | Mod: CPTII,S$GLB,,

## 2023-11-07 PROCEDURE — 99999 PR PBB SHADOW E&M-EST. PATIENT-LVL IV: ICD-10-PCS | Mod: PBBFAC,,,

## 2023-11-07 PROCEDURE — 99203 PR OFFICE/OUTPT VISIT, NEW, LEVL III, 30-44 MIN: ICD-10-PCS | Mod: S$GLB,,,

## 2023-11-07 NOTE — PROGRESS NOTES
Subjective:       Patient ID: Sara Ferro is a 57 y.o. female Body mass index is 27.18 kg/m².    Chief Complaint: Constipation    This patient is new to me.  Referring Provider: Dr. Jose Sahu for diarrhea.     GI Problem  The primary symptoms include abdominal pain and diarrhea (Resolved; reports stools were initially 1 on Campton scale then became 7 after the use of a laxative suppository to help relieve constipation). Primary symptoms do not include fever, weight loss, fatigue, nausea, vomiting, melena, hematemesis, jaundice, hematochezia or dysuria.   The abdominal pain began more than 2 days ago (Occurred a couple of weeks ago prior to office visit with Dr. Sahu 10/31/2023; patient reports he is not completed abdominal CT ordered by Dr. Sahu due to cost and resolution of symptoms). The abdominal pain has been resolved since its onset. The abdominal pain is located in the RLQ. The abdominal pain does not radiate. The severity of the abdominal pain is 0/10 (Denies pain currently; abdominal pain/pressure that worsened with palpation). The abdominal pain is relieved by bowel movements and passing flatus (Resolved after BMs; also reports taking Pepto-Bismol p.r.n. with improvement).   The illness is also significant for constipation (Chronic; typically has 1 BM every 2-3 days rated stool 4 on Campton scale). The illness does not include chills, anorexia, dysphagia, odynophagia or bloating. Significant associated medical issues include liver disease (hx of fatty liver). Associated medical issues do not include inflammatory bowel disease, GERD, gallstones, alcohol abuse, PUD, gastric bypass, bowel resection, irritable bowel syndrome, hemorrhoids or diverticulitis.     Review of Systems   Constitutional:  Negative for activity change, appetite change, chills, diaphoresis, fatigue, fever, unexpected weight change and weight loss.   HENT:  Negative for sore throat and trouble swallowing.     Respiratory:  Negative for cough, choking and shortness of breath.    Cardiovascular:  Negative for chest pain.   Gastrointestinal:  Positive for abdominal pain, constipation (Chronic; typically has 1 BM every 2-3 days rated stool 4 on Kane scale) and diarrhea (Resolved; reports stools were initially 1 on Kane scale then became 7 after the use of a laxative suppository to help relieve constipation). Negative for abdominal distention, anal bleeding, anorexia, bloating, blood in stool, dysphagia, hematemesis, hematochezia, jaundice, melena, nausea, rectal pain and vomiting.   Genitourinary:  Negative for dysuria.       No LMP recorded. Patient has had an ablation.  Past Medical History:   Diagnosis Date    Fatty liver      Past Surgical History:   Procedure Laterality Date    BELT ABDOMINOPLASTY       SECTION      x2    ENDOMETRIAL ABLATION      HYSTERECTOMY      TUBAL LIGATION       Family History   Problem Relation Age of Onset    Diabetes Mother     Heart disease Mother     Cancer Father         prostate    Prostate cancer Father     Colon polyps Sister     Breast cancer Maternal Aunt     Breast cancer Cousin     Ovarian cancer Neg Hx     Melanoma Neg Hx     Colon cancer Neg Hx     Esophageal cancer Neg Hx     Stomach cancer Neg Hx      Social History     Tobacco Use    Smoking status: Never     Passive exposure: Never    Smokeless tobacco: Never   Substance Use Topics    Alcohol use: Yes     Alcohol/week: 0.0 standard drinks of alcohol     Comment: socially    Drug use: No     Wt Readings from Last 10 Encounters:   23 67.4 kg (148 lb 9.4 oz)   10/31/23 67 kg (147 lb 11.3 oz)   23 66.7 kg (147 lb 0.8 oz)   23 65 kg (143 lb 4.8 oz)   23 65 kg (143 lb 4.8 oz)   03/15/21 77.1 kg (170 lb)   21 77.1 kg (170 lb)   20 77.1 kg (170 lb)   10/14/20 77.2 kg (170 lb 3.1 oz)   20 78.1 kg (172 lb 2.9 oz)     Lab Results   Component Value Date    WBC 6.69 10/31/2023    HGB  13.8 10/31/2023    HCT 41.3 10/31/2023    MCV 92 10/31/2023     10/31/2023     CMP  Sodium   Date Value Ref Range Status   10/31/2023 141 136 - 145 mmol/L Final     Potassium   Date Value Ref Range Status   10/31/2023 3.6 3.5 - 5.1 mmol/L Final     Chloride   Date Value Ref Range Status   10/31/2023 107 95 - 110 mmol/L Final     CO2   Date Value Ref Range Status   10/31/2023 26 23 - 29 mmol/L Final     Glucose   Date Value Ref Range Status   10/31/2023 62 (L) 70 - 110 mg/dL Final     BUN   Date Value Ref Range Status   10/31/2023 10 6 - 20 mg/dL Final     Creatinine   Date Value Ref Range Status   10/31/2023 0.7 0.5 - 1.4 mg/dL Final     Calcium   Date Value Ref Range Status   10/31/2023 9.6 8.7 - 10.5 mg/dL Final     Total Protein   Date Value Ref Range Status   10/31/2023 7.6 6.0 - 8.4 g/dL Final     Albumin   Date Value Ref Range Status   10/31/2023 3.6 3.5 - 5.2 g/dL Final     Total Bilirubin   Date Value Ref Range Status   10/31/2023 0.4 0.1 - 1.0 mg/dL Final     Comment:     For infants and newborns, interpretation of results should be based  on gestational age, weight and in agreement with clinical  observations.    Premature Infant recommended reference ranges:  Up to 24 hours.............<8.0 mg/dL  Up to 48 hours............<12.0 mg/dL  3-5 days..................<15.0 mg/dL  6-29 days.................<15.0 mg/dL       Alkaline Phosphatase   Date Value Ref Range Status   10/31/2023 99 55 - 135 U/L Final     AST   Date Value Ref Range Status   10/31/2023 30 10 - 40 U/L Final     ALT   Date Value Ref Range Status   10/31/2023 28 10 - 44 U/L Final     Anion Gap   Date Value Ref Range Status   10/31/2023 8 8 - 16 mmol/L Final     eGFR if    Date Value Ref Range Status   10/21/2020 >60 >60 mL/min/1.73 m^2 Final     eGFR if non    Date Value Ref Range Status   10/21/2020 >60 >60 mL/min/1.73 m^2 Final     Comment:     Calculation used to obtain the estimated glomerular  filtration  rate (eGFR) is the CKD-EPI equation.        Lab Results   Component Value Date    AMYLASE 59 03/11/2013     Lab Results   Component Value Date    LIPASE 56 10/31/2023     Lab Results   Component Value Date    TSH 1.431 10/31/2023     Reviewed prior medical records including radiology report of abdominal ultrasound 03/14/2013, KUB 03/11/2013, abdominal ultrasound 09/08/2010 & endoscopy history (see surgical history).    Objective:      Physical Exam  Vitals and nursing note reviewed.   Constitutional:       General: She is not in acute distress.     Appearance: Normal appearance. She is not ill-appearing.   HENT:      Mouth/Throat:      Lips: Pink. No lesions.   Cardiovascular:      Rate and Rhythm: Normal rate.      Pulses: Normal pulses.      Heart sounds: Normal heart sounds.   Pulmonary:      Effort: Pulmonary effort is normal. No respiratory distress.      Breath sounds: Normal breath sounds.   Abdominal:      General: Abdomen is flat. Bowel sounds are normal. There is no distension or abdominal bruit. There are no signs of injury.      Palpations: Abdomen is soft. There is no shifting dullness, fluid wave, hepatomegaly, splenomegaly or mass.      Tenderness: There is no abdominal tenderness. There is no guarding or rebound. Negative signs include Salinas's sign, Rovsing's sign and McBurney's sign.   Skin:     General: Skin is warm and dry.      Coloration: Skin is not jaundiced or pale.   Neurological:      Mental Status: She is alert and oriented to person, place, and time.   Psychiatric:         Attention and Perception: Attention normal.         Mood and Affect: Mood normal.         Speech: Speech normal.         Behavior: Behavior normal.         Assessment:       1. Constipation, unspecified constipation type    2. Change in bowel habits    3. History of diarrhea    4. History of abdominal pain    5. Fatty liver        Plan:       Constipation, unspecified constipation type  - schedule  Colonoscopy, discussed procedure with the patient, including risks and benefits, patient verbalized understanding  -Recommend daily exercise as tolerated, adequate water intake (six 8-oz glasses of water daily), and high fiber diet. OTC fiber supplements are recommended if diet does not reach daily fiber goal (20-30 grams daily), such as Metamucil, Citrucel, or FiberCon (take as directed, separate from other oral medications by >2 hours).  -Recommend trying OTC MiraLax once daily (17g PO) as directed  -If no improvement with above recommendations, try intermittently dosed Dulcolax OTC as directed (every 3-4 days) PRN to facilitate bowel movements  -If no relief with this, consider adding a emollient laxative (castor oil or mineral oil) +/- enema  -If still no improvement with these measures, call/follow-up  -consider Linzess  -     Case Request Endoscopy: COLONOSCOPY    Change in bowel habits  - schedule Colonoscopy, discussed procedure with the patient, including risks and benefits, patient verbalized understanding  -     Case Request Endoscopy: COLONOSCOPY    History of diarrhea  -resolved; occurred after using laxative suppository   -consider stool studies if diarrhea returns  -     Case Request Endoscopy: COLONOSCOPY    History of abdominal pain  - schedule Colonoscopy, discussed procedure with the patient, including risks and benefits, patient verbalized understanding  -resolved  -recommended patient to let me know if pain returns prior to colonoscopy  -recommended patient continue with CT if pain returns  -     Case Request Endoscopy: COLONOSCOPY    Fatty liver  For fatty liver recommend: low fat, low cholesterol diet, maintain good control of blood sugars and cholesterol levels, exercise, weight loss (if overweight), minimize/avoid alcohol and tylenol products, & follow-up with PCP for continued evaluation and management; if specialist is needed, recommend seeing hepatology.    Follow up in about 4 weeks  (around 12/5/2023), or if symptoms worsen or fail to improve.      If no improvement in symptoms or symptoms worsen, call/follow-up at clinic or go to ER.        Total time spent on the encounter includes face to face time and non-face to face time preparing to see the patient (eg, review of tests), Obtaining and/or reviewing separately obtained history, Documenting clinical information in the electronic or other health record, Independently interpreting results (not separately reported) and communicating results to the patient/family/caregiver, or Care coordination (not separately reported).     A dictation software program was used for this note. Please expect some simple typographical  errors in this note.

## 2024-01-18 ENCOUNTER — TELEPHONE (OUTPATIENT)
Dept: GASTROENTEROLOGY | Facility: CLINIC | Age: 59
End: 2024-01-18
Payer: COMMERCIAL

## 2024-01-18 NOTE — TELEPHONE ENCOUNTER
Call placed to Ms. Mejias in regards to message received. She requested to reschedule her colonoscopy to July 2024. Offered her 7/12 at 0830 arriving for 0730. She accepted. No further issues noted.

## 2024-01-18 NOTE — TELEPHONE ENCOUNTER
----- Message from Gloria Gillespie MA sent at 1/18/2024  7:55 AM CST -----  Type: Needs Medical Advice  Who Called:  pt  Best Call Back Number: [phone]   Additional Information: please advise pt want to cancel cscope procedure AND RS

## 2024-04-06 ENCOUNTER — OCCUPATIONAL HEALTH (OUTPATIENT)
Dept: URGENT CARE | Facility: CLINIC | Age: 59
End: 2024-04-06

## 2024-04-06 PROCEDURE — 80305 DRUG TEST PRSMV DIR OPT OBS: CPT | Mod: S$GLB,,,

## 2024-04-06 PROCEDURE — 92552 PURE TONE AUDIOMETRY AIR: CPT | Mod: S$GLB,,,

## 2024-04-06 PROCEDURE — 99499 UNLISTED E&M SERVICE: CPT | Mod: S$GLB,,,

## 2024-07-08 ENCOUNTER — TELEPHONE (OUTPATIENT)
Dept: GASTROENTEROLOGY | Facility: CLINIC | Age: 59
End: 2024-07-08
Payer: COMMERCIAL

## 2024-07-08 NOTE — TELEPHONE ENCOUNTER
----- Message from Areli Biggs sent at 7/8/2024  8:01 AM CDT -----  Contact: self  Type: Needs Medical Advice  Who Called:  pt  Best Call Back Number: [phone]   Additional Information: please call pt would like to know this preventive maintenance? Pt would also like to discuss her insurance.

## 2024-07-08 NOTE — TELEPHONE ENCOUNTER
Call placed to Ms. Sara in regards to message received. She stated she needed to cancel her procedure due to her insurance not covering her colonoscopy, and her condition has improved. No further issues noted.

## 2025-01-10 ENCOUNTER — OFFICE VISIT (OUTPATIENT)
Dept: FAMILY MEDICINE | Facility: CLINIC | Age: 60
End: 2025-01-10
Payer: COMMERCIAL

## 2025-01-10 ENCOUNTER — LAB VISIT (OUTPATIENT)
Dept: LAB | Facility: HOSPITAL | Age: 60
End: 2025-01-10
Attending: STUDENT IN AN ORGANIZED HEALTH CARE EDUCATION/TRAINING PROGRAM
Payer: COMMERCIAL

## 2025-01-10 VITALS
HEIGHT: 62 IN | DIASTOLIC BLOOD PRESSURE: 80 MMHG | HEART RATE: 99 BPM | BODY MASS INDEX: 28.39 KG/M2 | WEIGHT: 154.31 LBS | RESPIRATION RATE: 16 BRPM | OXYGEN SATURATION: 98 % | SYSTOLIC BLOOD PRESSURE: 110 MMHG | TEMPERATURE: 99 F

## 2025-01-10 DIAGNOSIS — Z12.31 ENCOUNTER FOR SCREENING MAMMOGRAM FOR MALIGNANT NEOPLASM OF BREAST: ICD-10-CM

## 2025-01-10 DIAGNOSIS — R53.83 FATIGUE, UNSPECIFIED TYPE: ICD-10-CM

## 2025-01-10 DIAGNOSIS — K21.9 GASTROESOPHAGEAL REFLUX DISEASE, UNSPECIFIED WHETHER ESOPHAGITIS PRESENT: ICD-10-CM

## 2025-01-10 DIAGNOSIS — R11.0 NAUSEA: ICD-10-CM

## 2025-01-10 DIAGNOSIS — R10.13 EPIGASTRIC PAIN: ICD-10-CM

## 2025-01-10 DIAGNOSIS — G89.29 CHRONIC NONINTRACTABLE HEADACHE, UNSPECIFIED HEADACHE TYPE: ICD-10-CM

## 2025-01-10 DIAGNOSIS — R10.13 EPIGASTRIC PAIN: Primary | ICD-10-CM

## 2025-01-10 DIAGNOSIS — R79.9 ABNORMAL FINDING OF BLOOD CHEMISTRY, UNSPECIFIED: ICD-10-CM

## 2025-01-10 DIAGNOSIS — E78.2 MIXED HYPERLIPIDEMIA: ICD-10-CM

## 2025-01-10 DIAGNOSIS — R51.9 CHRONIC NONINTRACTABLE HEADACHE, UNSPECIFIED HEADACHE TYPE: ICD-10-CM

## 2025-01-10 LAB
ALBUMIN SERPL BCP-MCNC: 4.1 G/DL (ref 3.5–5.2)
ALP SERPL-CCNC: 119 U/L (ref 40–150)
ALT SERPL W/O P-5'-P-CCNC: 70 U/L (ref 10–44)
ANION GAP SERPL CALC-SCNC: 15 MMOL/L (ref 8–16)
AST SERPL-CCNC: 50 U/L (ref 10–40)
BILIRUB SERPL-MCNC: 1.1 MG/DL (ref 0.1–1)
BUN SERPL-MCNC: 9 MG/DL (ref 6–20)
CALCIUM SERPL-MCNC: 10.7 MG/DL (ref 8.7–10.5)
CHLORIDE SERPL-SCNC: 105 MMOL/L (ref 95–110)
CHOLEST SERPL-MCNC: 197 MG/DL (ref 120–199)
CHOLEST/HDLC SERPL: 4.4 {RATIO} (ref 2–5)
CO2 SERPL-SCNC: 22 MMOL/L (ref 23–29)
CREAT SERPL-MCNC: 0.8 MG/DL (ref 0.5–1.4)
ERYTHROCYTE [DISTWIDTH] IN BLOOD BY AUTOMATED COUNT: 12.5 % (ref 11.5–14.5)
EST. GFR  (NO RACE VARIABLE): >60 ML/MIN/1.73 M^2
ESTIMATED AVG GLUCOSE: 103 MG/DL (ref 68–131)
FERRITIN SERPL-MCNC: 595 NG/ML (ref 20–300)
GLUCOSE SERPL-MCNC: 95 MG/DL (ref 70–110)
HBA1C MFR BLD: 5.2 % (ref 4–5.6)
HCT VFR BLD AUTO: 48.1 % (ref 37–48.5)
HDLC SERPL-MCNC: 45 MG/DL (ref 40–75)
HDLC SERPL: 22.8 % (ref 20–50)
HGB BLD-MCNC: 16.7 G/DL (ref 12–16)
LDLC SERPL CALC-MCNC: 124.2 MG/DL (ref 63–159)
LIPASE SERPL-CCNC: 52 U/L (ref 4–60)
MCH RBC QN AUTO: 31.4 PG (ref 27–31)
MCHC RBC AUTO-ENTMCNC: 34.7 G/DL (ref 32–36)
MCV RBC AUTO: 90 FL (ref 82–98)
NONHDLC SERPL-MCNC: 152 MG/DL
OHS QRS DURATION: 86 MS
OHS QTC CALCULATION: 452 MS
PLATELET # BLD AUTO: 282 K/UL (ref 150–450)
PMV BLD AUTO: 11.1 FL (ref 9.2–12.9)
POTASSIUM SERPL-SCNC: 4.5 MMOL/L (ref 3.5–5.1)
PROT SERPL-MCNC: 8.7 G/DL (ref 6–8.4)
RBC # BLD AUTO: 5.32 M/UL (ref 4–5.4)
SODIUM SERPL-SCNC: 142 MMOL/L (ref 136–145)
TRIGL SERPL-MCNC: 139 MG/DL (ref 30–150)
WBC # BLD AUTO: 10.11 K/UL (ref 3.9–12.7)

## 2025-01-10 PROCEDURE — 80061 LIPID PANEL: CPT | Performed by: STUDENT IN AN ORGANIZED HEALTH CARE EDUCATION/TRAINING PROGRAM

## 2025-01-10 PROCEDURE — 82728 ASSAY OF FERRITIN: CPT | Performed by: STUDENT IN AN ORGANIZED HEALTH CARE EDUCATION/TRAINING PROGRAM

## 2025-01-10 PROCEDURE — 80053 COMPREHEN METABOLIC PANEL: CPT | Performed by: STUDENT IN AN ORGANIZED HEALTH CARE EDUCATION/TRAINING PROGRAM

## 2025-01-10 PROCEDURE — 83690 ASSAY OF LIPASE: CPT | Performed by: STUDENT IN AN ORGANIZED HEALTH CARE EDUCATION/TRAINING PROGRAM

## 2025-01-10 PROCEDURE — 99999 PR PBB SHADOW E&M-EST. PATIENT-LVL V: CPT | Mod: PBBFAC,,, | Performed by: STUDENT IN AN ORGANIZED HEALTH CARE EDUCATION/TRAINING PROGRAM

## 2025-01-10 PROCEDURE — 85027 COMPLETE CBC AUTOMATED: CPT | Performed by: STUDENT IN AN ORGANIZED HEALTH CARE EDUCATION/TRAINING PROGRAM

## 2025-01-10 PROCEDURE — 83036 HEMOGLOBIN GLYCOSYLATED A1C: CPT | Performed by: STUDENT IN AN ORGANIZED HEALTH CARE EDUCATION/TRAINING PROGRAM

## 2025-01-10 RX ORDER — PANTOPRAZOLE SODIUM 40 MG/1
40 TABLET, DELAYED RELEASE ORAL DAILY
Qty: 90 TABLET | Refills: 1 | Status: SHIPPED | OUTPATIENT
Start: 2025-01-10 | End: 2026-01-10

## 2025-01-10 RX ORDER — BUTALBITAL, ACETAMINOPHEN AND CAFFEINE 50; 325; 40 MG/1; MG/1; MG/1
1 TABLET ORAL EVERY 6 HOURS PRN
Qty: 12 TABLET | Refills: 0 | Status: SHIPPED | OUTPATIENT
Start: 2025-01-10 | End: 2025-01-10 | Stop reason: SDUPTHER

## 2025-01-10 RX ORDER — BUTALBITAL, ACETAMINOPHEN AND CAFFEINE 50; 325; 40 MG/1; MG/1; MG/1
1 TABLET ORAL EVERY 6 HOURS PRN
Qty: 12 TABLET | Refills: 0 | Status: SHIPPED | OUTPATIENT
Start: 2025-01-10 | End: 2025-01-13

## 2025-01-10 RX ORDER — ONDANSETRON 4 MG/1
4 TABLET, ORALLY DISINTEGRATING ORAL EVERY 6 HOURS PRN
Qty: 12 TABLET | Refills: 0 | Status: SHIPPED | OUTPATIENT
Start: 2025-01-10 | End: 2025-01-13

## 2025-01-10 NOTE — TELEPHONE ENCOUNTER
No care due was identified.  [facility] Embedded Care Due Messages. Reference number: [id number].   1/10/2025 1:57:40 PM CST

## 2025-01-10 NOTE — TELEPHONE ENCOUNTER
Spoke to Pharmacist Jonn. Given verbal order to fill     butalbital-acetaminophen-caffeine -40 mg (FIORICET, ESGIC) -40 mg per tablet 12 tablet 0 1/10/2025 1/13/2025 No   Sig - Route: Take 1 tablet by mouth every 6 (six) hours as needed for Headaches.     Pharmacy read back order and confirmed order

## 2025-01-10 NOTE — PROGRESS NOTES
[facility] Note    Subjective:    The HPI and pertinent ROS is included in the Diagnostic Impression Remarks section at the end of the note. Please see below for further details. Chief complaint is at end of note.     Randell is a pleasant intelligent patient who is here for evaluation.     Modified Medications    No medications on file       Data reviewed 274}  Previous medical records reviewed and summarized in plan section at end of note.      If you are due for any health screening(s) below please notify me so we can arrange them to be ordered and scheduled. Most healthy patients at your age complete them, but you are free to accept or refuse. If you can't do it, I'll definitely understand. If you can, I'd certainly appreciate it!     Tests to Keep You Healthy    Mammogram: ORDERED BUT NOT SCHEDULED  Colon Cancer Screening: DUE      The following portions of the patient's history were reviewed and updated as appropriate: allergies, current medications, past family history, past medical history, past social history, past surgical history and problem list.    She  has a past medical history of Fatty liver and GERD (gastroesophageal reflux disease).  She  has a past surgical history that includes Endometrial ablation;  section; Tubal ligation; Belt abdominoplasty; and Hysterectomy.    She  reports that she has never smoked. She has never been exposed to tobacco smoke. She has never used smokeless tobacco. She reports current alcohol use. She reports that she does not use drugs.  She family history includes Breast cancer in her cousin and maternal aunt; Cancer in her father; Colon polyps in her sister; Diabetes in her mother; Heart disease in her mother; Prostate cancer in her father.    Review of patient's allergies indicates:  No Known Allergies    Tobacco Use: Low Risk  (1/10/2025)    Patient History     Smoking Tobacco Use: Never     Smokeless Tobacco Use: Never     Passive Exposure: Never  "    Physical Examination  Physical Exam       General appearance: alert, cooperative, no distress  Neck: no thyromegaly, no neck stiffness  Lungs: clear to auscultation, no wheezes, rales or rhonchi, symmetric air entry  Heart: normal rate, regular rhythm, normal S1, S2, no murmurs, rubs, clicks or gallops  Abdomen: soft, epigastric tenderness no ruq tnederness, nondistended, no rigidity, rebound, or guarding.   Back: no point tenderness over spine  Extremities: peripheral pulses normal, no unilateral leg swelling or calf tenderness   Neurological:alert, oriented, normal speech, no new focal findings or movement disorder noted from baseline      BP Readings from Last 3 Encounters:   01/10/25 110/80   11/07/23 112/72   10/31/23 120/80     Wt Readings from Last 3 Encounters:   01/10/25 70 kg (154 lb 5.2 oz)   11/07/23 67.4 kg (148 lb 9.4 oz)   10/31/23 67 kg (147 lb 11.3 oz)     /80 (BP Location: Right arm, Patient Position: Sitting)   Pulse 99   Temp 98.5 °F (36.9 °C) (Oral)   Resp 16   Ht 5' 2" (1.575 m)   Wt 70 kg (154 lb 5.2 oz)   SpO2 98%   BMI 28.23 kg/m²    274}  Laboratory: I have reviewed old labs below:    274}    Lab Results   Component Value Date    WBC 6.69 10/31/2023    HGB 13.8 10/31/2023    HCT 41.3 10/31/2023    MCV 92 10/31/2023     10/31/2023     10/31/2023    K 3.6 10/31/2023     10/31/2023    CALCIUM 9.6 10/31/2023    CO2 26 10/31/2023    GLU 62 (L) 10/31/2023    BUN 10 10/31/2023    CREATININE 0.7 10/31/2023    EGFRNORACEVR >60.0 10/31/2023    ANIONGAP 8 10/31/2023    PROT 7.6 10/31/2023    ALBUMIN 3.6 10/31/2023    BILITOT 0.4 10/31/2023    ALKPHOS 99 10/31/2023    ALT 28 10/31/2023    AST 30 10/31/2023    CHOL 170 10/31/2023    TRIG 152 (H) 10/31/2023    HDL 44 10/31/2023    LDLCALC 95.6 10/31/2023    TSH 1.431 10/31/2023    HGBA1C 5.3 10/31/2023      Lab reviewed by me: Particular labs of significance that I will monitor, workup, or treat to improve are " mentioned below in diagnostic impression remarks.    Results         Imaging/EKG: I have reviewed the pertinent results and my findings are noted in remarks.  274}    CC:   Chief Complaint   Patient presents with    Gastroesophageal Reflux        274}    History of Present Illness  The patient is a 59-year-old female who presents for follow-up.    She experienced an episode of vomiting yesterday, which was followed by severe epigastric pain radiating down her hand. She has been experiencing heartburn intermittently for the past few months, with the pain localized in the center of her chest and extending up to her neck. The heartburn is more pronounced at night and is exacerbated by certain foods such as crackers. She has not made any recent changes to her diet or fluid intake. She has not been taking ibuprofen, meloxicam, or over-the-counter naproxen. She reports no presence of blood or black stools. She has been using Tums and Pepto-Bismol for relief. She reports no unintentional weight loss or night sweats. She has a history of constipation, which has improved. She experiences stomach cramps due to constipation but has never had heartburn. She reports no right shoulder pain or right upper quadrant pain. She does not consume alcohol. She is no longer taking Celebrex. She reports no shortness of breath, palpitations, or racing heart. She reports no diarrhea. She reports no pain in the right lower quadrant or left lower quadrant.    She also reports a burning sensation throughout her neck, chest, back, and arms, which she attributes to strain. She is requesting medication for her headache, which she describes as a pressure-like sensation originating from the back of her neck. She also reports neck pain and stiffness.    SOCIAL HISTORY  She does not drink alcohol.    MEDICATIONS  Current: Tums, Pepto-Bismol  Discontinued: Celebrex       Assessment/Plan  Sara Ferro is a 59 y.o. female who presents to  clinic with:  1. Epigastric pain    2. Nausea    3. Gastroesophageal reflux disease, unspecified whether esophagitis present    4. Fatigue, unspecified type    5. Abnormal finding of blood chemistry, unspecified    6. Mixed hyperlipidemia    7. Encounter for screening mammogram for malignant neoplasm of breast    8. Chronic nonintractable headache, unspecified headache type       274}    Assessment & Plan  1. Epigastric pain.  She reports epigastric pain radiating to her neck and arm, with associated nausea and vomiting. There is no radiation to the right upper quadrant, no shoulder pain, and no fever or chills. She has a history of heartburn and constipation but no recent NSAID use or alcohol consumption. There is no blood or black stool. A Protonix 40 mg will be prescribed to manage the symptoms. Blood work will be ordered to check lipase levels, liver enzymes, and complete blood count. An ultrasound will be conducted to examine the gallbladder and pancreas. A referral to a gastroenterologist will be made for further evaluation, including potential endoscopy to rule out ulcers. If symptoms persist, a GI referral will be considered to rule out ulcers.    2. Gastroesophageal reflux disease.  She reports worsening heartburn over the past couple of months, which is random and not associated with specific foods. The heartburn is worse at night and sometimes radiates to her neck. A Protonix 40 mg will be prescribed to manage the symptoms. She is advised to avoid NSAIDs, which can worsen GERD. If the Protonix does not alleviate the symptoms, further evaluation by a gastroenterologist will be considered.    3. Headache.  She reports a headache characterized by pressure starting from the back of her neck. Fioricet will be prescribed for headache management. She is advised to use this medication sparingly to avoid dependency. If the Fioricet does not help, she should send a message for an alternative medication.     Fatigue  will check blood work     This note was generated with the assistance of ambient listening technology. Verbal consent was obtained by the patient and accompanying visitor(s) for the recording of patient appointment to facilitate this note. I attest to having reviewed and edited the generated note for accuracy, though some syntax or spelling errors may persist. Please contact the author of this note for any clarification.      1. Epigastric pain  - LIPASE; Future  - IN OFFICE EKG 12-LEAD (to [place])  - Ambulatory referral/consult to Gastroenterology; Future  - Ferritin; Future  - US Abdomen Limited; Future    2. Nausea  - ondansetron (ZOFRAN-ODT) 4 MG TbDL; Take 1 tablet (4 mg total) by mouth every 6 (six) hours as needed (nausea).  Dispense: 12 tablet; Refill: 0    3. Gastroesophageal reflux disease, unspecified whether esophagitis present  - pantoprazole (PROTONIX) 40 MG tablet; Take 1 tablet (40 mg total) by mouth once daily.  Dispense: 90 tablet; Refill: 1  - LIPASE; Future    4. Fatigue, unspecified type    5. Abnormal finding of blood chemistry, unspecified  - Lipid Panel; Future  - Hemoglobin A1C; Future  - CBC Without Differential; Future  - Comprehensive Metabolic Panel; Future    6. Mixed hyperlipidemia    7. Encounter for screening mammogram for malignant neoplasm of breast  - Mammo Digital Screening Bilat w/ John; Future    8. Chronic nonintractable headache, unspecified headache type  - butalbital-acetaminophen-caffeine -40 mg (FIORICET, ESGIC) -40 mg per tablet; Take 1 tablet by mouth every 6 (six) hours as needed for Headaches.  Dispense: 12 tablet; Refill: 0      Jose Sahu MD   274}    If you are due for any health screening(s) below please notify me so we can arrange them to be ordered and scheduled. Most healthy patients at your age complete them, but you are free to accept or refuse.     If you can't do it, I'll definitely understand. If you can, I'd certainly appreciate it!   Tests  to Keep You Healthy    Mammogram: ORDERED BUT NOT SCHEDULED  Colon Cancer Screening: DUE

## 2025-01-10 NOTE — PATIENT INSTRUCTIONS
Pravin Ruiz,     If you are due for any health screening(s) below please notify me so we can arrange them to be ordered and scheduled. Most healthy patients at your age complete them, but you are free to accept or refuse.     If you can't do it, I'll definitely understand. If you can, I'd certainly appreciate it!    Tests to Keep You Healthy    Mammogram: ORDERED BUT NOT SCHEDULED  Colon Cancer Screening: DUE      Schedule your breast cancer screening today     Breast cancer is the second most common cancer in women,  and the second leading cause of death from cancer. Mammograms can detect breast cancer early, which significantly increases the chances of curing the cancer.       Our records indicate that you may be overdue for breast cancer screening. Cancer screenings save lives, so schedule yours today to stay healthy.     If you recently had a mammogram performed outside of [facility], please let your Health care team know so that they can update your health record.        Its time for your colon cancer screening     Colorectal cancer is one of the leading causes of cancer death for men and women but it doesnt have to be. Screenings can prevent colorectal cancer or find it early enough to treat and cure the disease.     Our records indicate that you may be overdue for colon cancer screening. A colonoscopy or stool screening test can help identify patients at risk for developing colon cancer. Cancer screenings save lives, so schedule yours today to stay healthy.     A colonoscopy is the preferred test for detecting colon cancer. It is needed only once every 10 years if results are negative. While you are sedated, a flexible, lighted tube with a tiny camera is inserted into the rectum and advanced through the colon to look for cancers.     An alternative screening test that is used at home and returned to the lab may also be used. It detects hidden blood in bowel movements which could indicate cancer  in the colon. If results are positive, you will need a colonoscopy to determine if the blood is a sign of cancer. This type of follow up (diagnostic) colonoscopy usually requires additional copays as required by your insurance provider.     If you recently had your colon cancer screening performed outside of [facility], please let your Health care team know so that they can update your health record. Please contact your PCP if you have any questions.

## 2025-01-12 ENCOUNTER — PATIENT MESSAGE (OUTPATIENT)
Dept: FAMILY MEDICINE | Facility: CLINIC | Age: 60
End: 2025-01-12
Payer: COMMERCIAL

## 2025-01-12 DIAGNOSIS — R94.5 ABNORMAL RESULTS OF LIVER FUNCTION STUDIES: Primary | ICD-10-CM

## 2025-01-16 ENCOUNTER — HOSPITAL ENCOUNTER (OUTPATIENT)
Dept: RADIOLOGY | Facility: HOSPITAL | Age: 60
Discharge: HOME | End: 2025-01-16
Attending: STUDENT IN AN ORGANIZED HEALTH CARE EDUCATION/TRAINING PROGRAM
Payer: COMMERCIAL

## 2025-01-16 DIAGNOSIS — R10.13 EPIGASTRIC PAIN: ICD-10-CM

## 2025-01-16 PROCEDURE — 76705 ECHO EXAM OF ABDOMEN: CPT | Mod: 26,,, | Performed by: RADIOLOGY

## 2025-01-16 PROCEDURE — 76705 ECHO EXAM OF ABDOMEN: CPT | Mod: TC,PO

## 2025-01-17 ENCOUNTER — TELEPHONE (OUTPATIENT)
Dept: FAMILY MEDICINE | Facility: CLINIC | Age: 60
End: 2025-01-17
Payer: COMMERCIAL

## 2025-01-17 NOTE — TELEPHONE ENCOUNTER
----- Message from Alberta sent at 1/17/2025  9:20 AM CST -----  Type: Needs Medical Advice  Who Called:  Patient   Symptoms (please be specific):    How long has patient had these symptoms:    Pharmacy name and phone #:    Best Call Back Number: [phone]  Additional Information: Patient is requesting call back from the nurse  in regards to her test results.

## 2025-01-18 ENCOUNTER — PATIENT MESSAGE (OUTPATIENT)
Dept: GASTROENTEROLOGY | Facility: CLINIC | Age: 60
End: 2025-01-18
Payer: COMMERCIAL

## 2025-01-22 ENCOUNTER — OFFICE VISIT (OUTPATIENT)
Dept: GASTROENTEROLOGY | Facility: CLINIC | Age: 60
End: 2025-01-22
Payer: COMMERCIAL

## 2025-01-22 DIAGNOSIS — R10.13 EPIGASTRIC PAIN: ICD-10-CM

## 2025-01-22 DIAGNOSIS — K21.9 GASTROESOPHAGEAL REFLUX DISEASE, UNSPECIFIED WHETHER ESOPHAGITIS PRESENT: Primary | ICD-10-CM

## 2025-01-22 NOTE — PROGRESS NOTES
Subjective:       Patient ID: Sara Ferro is a 59 y.o. female There is no height or weight on file to calculate BMI.    Chief Complaint: No chief complaint on file.    This patient is new to me.  Referring Provider: Dr. Jose Sahu for Epigastric pain.  Established patient of Dr. Whipple and CHANO Che NP.     The patient location is: Home ([place])  The chief complaint leading to consultation is: GERD  Visit type: audiovisual  Face to Face time with patient: 10  20 minutes of total time spent on the encounter, which includes face to face time and non-face to face time preparing to see the patient (eg, review of tests), Obtaining and/or reviewing separately obtained history, Documenting clinical information in the electronic or other health record, Independently interpreting results (not separately reported) and communicating results to the patient/family/caregiver, or Care coordination (not separately reported).     Each patient to whom he or she provides medical services by telemedicine is:  (1) informed of the relationship between the physician and patient and the respective role of any other health care provider with respect to management of the patient; and (2) notified that he or she may decline to receive medical services by telemedicine and may withdraw from such care at any time.      Needs screening colonoscopy and EGD    Started on protonix 1/10/25 - only took once and takes PRN       Gastroesophageal Reflux  She complains of abdominal pain, chest pain, heartburn, nausea and a sore throat. She reports no coughing. This is a recurrent problem. The current episode started 1 to 4 weeks ago. The problem occurs occasionally. The problem has been resolved. Pertinent negatives include no fatigue.     Review of Systems   Constitutional:  Positive for appetite change. Negative for activity change, fatigue, fever and unexpected weight change.   HENT:  Positive for sore throat. Negative for  trouble swallowing.    Respiratory:  Negative for cough and shortness of breath.    Cardiovascular:  Positive for chest pain.   Gastrointestinal:  Positive for abdominal pain, heartburn and nausea. Negative for abdominal distention, anal bleeding, blood in stool, constipation, diarrhea, rectal pain and vomiting.       No LMP recorded. Patient has had an ablation.  Past Medical History:   Diagnosis Date    Fatty liver     GERD (gastroesophageal reflux disease)      Past Surgical History:   Procedure Laterality Date    BELT ABDOMINOPLASTY       SECTION      x2    ENDOMETRIAL ABLATION      HYSTERECTOMY      TUBAL LIGATION       Family History   Problem Relation Name Age of Onset    Diabetes Mother      Heart disease Mother      Cancer Father          prostate    Prostate cancer Father      Colon polyps Sister      Breast cancer Maternal Aunt      Breast cancer Cousin      Ovarian cancer Neg Hx      Melanoma Neg Hx      Colon cancer Neg Hx      Esophageal cancer Neg Hx      Stomach cancer Neg Hx       Social History     Tobacco Use    Smoking status: Never     Passive exposure: Never    Smokeless tobacco: Never   Substance Use Topics    Alcohol use: Yes     Alcohol/week: 0.0 standard drinks of alcohol     Comment: socially    Drug use: No     Wt Readings from Last 10 Encounters:   01/10/25 70 kg (154 lb 5.2 oz)   23 67.4 kg (148 lb 9.4 oz)   10/31/23 67 kg (147 lb 11.3 oz)   23 66.7 kg (147 lb 0.8 oz)   23 65 kg (143 lb 4.8 oz)   23 65 kg (143 lb 4.8 oz)   03/15/21 77.1 kg (170 lb)   21 77.1 kg (170 lb)   20 77.1 kg (170 lb)   10/14/20 77.2 kg (170 lb 3.1 oz)     Lab Results   Component Value Date    WBC 10.11 01/10/2025    HGB 16.7 (H) 01/10/2025    HCT 48.1 01/10/2025    MCV 90 01/10/2025     01/10/2025     CMP  Sodium   Date Value Ref Range Status   01/10/2025 142 136 - 145 mmol/L Final     Potassium   Date Value Ref Range Status   01/10/2025 4.5 3.5 - 5.1 mmol/L  "Final     Chloride   Date Value Ref Range Status   01/10/2025 105 95 - 110 mmol/L Final     CO2   Date Value Ref Range Status   01/10/2025 22 (L) 23 - 29 mmol/L Final     Glucose   Date Value Ref Range Status   01/10/2025 95 70 - 110 mg/dL Final     BUN   Date Value Ref Range Status   01/10/2025 9 6 - 20 mg/dL Final     Creatinine   Date Value Ref Range Status   01/10/2025 0.8 0.5 - 1.4 mg/dL Final     Calcium   Date Value Ref Range Status   01/10/2025 10.7 (H) 8.7 - 10.5 mg/dL Final     Total Protein   Date Value Ref Range Status   01/16/2025 7.3 6.0 - 8.4 g/dL Final     Albumin   Date Value Ref Range Status   01/16/2025 4.1 3.5 - 5.2 g/dL Final     Total Bilirubin   Date Value Ref Range Status   01/16/2025 0.5 0.1 - 1.0 mg/dL Final     Comment:     For infants and newborns, interpretation of results should be based  on gestational age, weight and in agreement with clinical  observations.    Premature Infant recommended reference ranges:  Up to 24 hours.............<8.0 mg/dL  Up to 48 hours............<12.0 mg/dL  3-5 days..................<15.0 mg/dL  6-29 days.................<15.0 mg/dL       Alkaline Phosphatase   Date Value Ref Range Status   01/16/2025 92 55 - 135 U/L Final     AST   Date Value Ref Range Status   01/16/2025 37 10 - 40 U/L Final     ALT   Date Value Ref Range Status   01/16/2025 52 (H) 10 - 44 U/L Final     Anion Gap   Date Value Ref Range Status   01/10/2025 15 8 - 16 mmol/L Final     eGFR if    Date Value Ref Range Status   10/21/2020 >60 >60 mL/min/1.73 m^2 Final     eGFR if non    Date Value Ref Range Status   10/21/2020 >60 >60 mL/min/1.73 m^2 Final     Comment:     Calculation used to obtain the estimated glomerular filtration  rate (eGFR) is the CKD-EPI equation.        Lab Results   Component Value Date    AMYLASE 59 03/11/2013     Lab Results   Component Value Date    LIPASE 52 01/10/2025     No results found for: "LIPASERES"  Lab Results   Component " Value Date    TSH 1.431 10/31/2023       Reviewed prior medical records including radiology report of OV with Dr Sahu 1/2025 & endoscopy history (see surgical history).    Objective:      Physical Exam  Vitals reviewed: Due to weather VV done and no physical exam.   Neurological:      Mental Status: She is alert and oriented to person, place, and time.         Assessment:       1. Gastroesophageal reflux disease, unspecified whether esophagitis present    2. Epigastric pain        Plan:       Gastroesophageal reflux disease, unspecified whether esophagitis present & Epigastric pain  -discussed about the different types of medications used to treat reflux and how to use them, antacids can be used PRN for breakthrough heartburn symptoms by reducing stomach acid that is already produced, H2 blockers work by limiting the amount acid production, & PPI's work to block acid production and are taken daily, patient verbalized understanding.  -Educated patient on lifestyle modifications to help control/reduce reflux/abdominal pain including: avoid large meals, avoid eating within 2-3 hours of bedtime (avoid late night eating & lying down soon after eating), elevate head of bed if nocturnal symptoms are present, smoking cessation (if current smoker), & weight loss (if overweight).   -Educated to avoid known foods which trigger reflux symptoms & to minimize/avoid high-fat foods, chocolate, caffeine, citrus, alcohol, & tomato products.  -Advised to avoid/limit use of NSAID's, since they can cause GI upset, bleeding, and/or ulcers. If needed, take with food.     Take protonix daily as ordered  Avoid trigger foods  Consider EGD     -     Ambulatory referral/consult to Gastroenterology  Due for screening colonoscopy now - patient would like to wait to schedule this at this time.     Follow up in about 1 year (around 1/22/2026), or if symptoms worsen or fail to improve.      If no improvement in symptoms or symptoms worsen,  call/follow-up at clinic or go to ER.       MENA Sapp, SUSANNE    Encounter includes face to face time and non-face to face time preparing to see the patient (eg, review of tests), obtaining and/or reviewing separately obtained history, documenting clinical information in the electronic or other health record, independently interpreting results (not separately reported) and communicating results to the patient/family/caregiver, or care coordination (not separately reported).     A dictation software program was used for this note. Please expect some simple typographical errors in this note.

## 2025-02-14 ENCOUNTER — PATIENT OUTREACH (OUTPATIENT)
Dept: ADMINISTRATIVE | Facility: HOSPITAL | Age: 60
End: 2025-02-14
Payer: COMMERCIAL

## 2025-02-14 ENCOUNTER — PATIENT MESSAGE (OUTPATIENT)
Dept: ADMINISTRATIVE | Facility: HOSPITAL | Age: 60
End: 2025-02-14
Payer: COMMERCIAL

## 2025-02-14 NOTE — PROGRESS NOTES
Population Health Chart Review & Patient Outreach Details    Outreach Performed: YES Portal Active and/or Letter inactive    Additional [facility] Notes:    BREAST CANCER SCREENING    Non-compliant report chart audits for BREAST CANCER SCREENING     Outreach to patient in reference to SCHEDULING A MAMMOGRAM EXAM.            Updates Requested / Reviewed:               Health Maintenance Topics Overdue:      VBHM Score: 2     Colon Cancer Screening  Mammogram